# Patient Record
Sex: FEMALE | Race: BLACK OR AFRICAN AMERICAN | NOT HISPANIC OR LATINO | URBAN - METROPOLITAN AREA
[De-identification: names, ages, dates, MRNs, and addresses within clinical notes are randomized per-mention and may not be internally consistent; named-entity substitution may affect disease eponyms.]

---

## 2017-07-03 ENCOUNTER — EMERGENCY (EMERGENCY)
Facility: HOSPITAL | Age: 22
LOS: 0 days | Discharge: HOME | End: 2017-07-03

## 2017-07-03 DIAGNOSIS — R63.4 ABNORMAL WEIGHT LOSS: ICD-10-CM

## 2017-07-03 DIAGNOSIS — R10.13 EPIGASTRIC PAIN: ICD-10-CM

## 2017-07-03 DIAGNOSIS — R11.0 NAUSEA: ICD-10-CM

## 2017-07-06 ENCOUNTER — EMERGENCY (EMERGENCY)
Facility: HOSPITAL | Age: 22
LOS: 0 days | Discharge: HOME | End: 2017-07-06

## 2017-07-06 DIAGNOSIS — N63 UNSPECIFIED LUMP IN BREAST: ICD-10-CM

## 2017-07-06 DIAGNOSIS — R59.1 GENERALIZED ENLARGED LYMPH NODES: ICD-10-CM

## 2017-07-06 DIAGNOSIS — Z79.899 OTHER LONG TERM (CURRENT) DRUG THERAPY: ICD-10-CM

## 2017-07-06 DIAGNOSIS — N64.4 MASTODYNIA: ICD-10-CM

## 2017-11-03 ENCOUNTER — EMERGENCY (EMERGENCY)
Facility: HOSPITAL | Age: 22
LOS: 0 days | Discharge: HOME | End: 2017-11-03

## 2017-11-09 DIAGNOSIS — N64.4 MASTODYNIA: ICD-10-CM

## 2017-11-09 DIAGNOSIS — Z79.899 OTHER LONG TERM (CURRENT) DRUG THERAPY: ICD-10-CM

## 2017-11-09 DIAGNOSIS — R06.02 SHORTNESS OF BREATH: ICD-10-CM

## 2018-02-26 ENCOUNTER — APPOINTMENT (OUTPATIENT)
Dept: BREAST CENTER | Facility: CLINIC | Age: 23
End: 2018-02-26
Payer: COMMERCIAL

## 2018-02-26 ENCOUNTER — EMERGENCY (EMERGENCY)
Facility: HOSPITAL | Age: 23
LOS: 0 days | Discharge: HOME | End: 2018-02-26

## 2018-02-26 VITALS
OXYGEN SATURATION: 97 % | RESPIRATION RATE: 18 BRPM | SYSTOLIC BLOOD PRESSURE: 132 MMHG | HEART RATE: 91 BPM | DIASTOLIC BLOOD PRESSURE: 63 MMHG | TEMPERATURE: 98 F

## 2018-02-26 VITALS
WEIGHT: 118 LBS | DIASTOLIC BLOOD PRESSURE: 72 MMHG | HEIGHT: 66 IN | SYSTOLIC BLOOD PRESSURE: 100 MMHG | BODY MASS INDEX: 18.96 KG/M2

## 2018-02-26 DIAGNOSIS — Z3A.01 LESS THAN 8 WEEKS GESTATION OF PREGNANCY: ICD-10-CM

## 2018-02-26 DIAGNOSIS — Z80.3 FAMILY HISTORY OF MALIGNANT NEOPLASM OF BREAST: ICD-10-CM

## 2018-02-26 DIAGNOSIS — Z79.2 LONG TERM (CURRENT) USE OF ANTIBIOTICS: ICD-10-CM

## 2018-02-26 DIAGNOSIS — R53.1 WEAKNESS: ICD-10-CM

## 2018-02-26 DIAGNOSIS — N64.4 MASTODYNIA: ICD-10-CM

## 2018-02-26 DIAGNOSIS — O91.111: ICD-10-CM

## 2018-02-26 DIAGNOSIS — K21.9 GASTRO-ESOPHAGEAL REFLUX DISEASE W/OUT ESOPHAGITIS: ICD-10-CM

## 2018-02-26 PROBLEM — Z00.00 ENCOUNTER FOR PREVENTIVE HEALTH EXAMINATION: Status: ACTIVE | Noted: 2018-02-26

## 2018-02-26 LAB
ALBUMIN SERPL ELPH-MCNC: 4.4 G/DL — SIGNIFICANT CHANGE UP (ref 3–5.5)
ALP SERPL-CCNC: 67 U/L — SIGNIFICANT CHANGE UP (ref 30–115)
ALT FLD-CCNC: 17 U/L — SIGNIFICANT CHANGE UP (ref 0–41)
ANION GAP SERPL CALC-SCNC: 7 MMOL/L — SIGNIFICANT CHANGE UP (ref 7–14)
APPEARANCE UR: CLEAR — SIGNIFICANT CHANGE UP
APTT BLD: 27.7 SEC — SIGNIFICANT CHANGE UP (ref 27–39.2)
AST SERPL-CCNC: 21 U/L — SIGNIFICANT CHANGE UP (ref 0–41)
BASOPHILS # BLD AUTO: 0.03 K/UL — SIGNIFICANT CHANGE UP (ref 0–0.2)
BASOPHILS NFR BLD AUTO: 0.2 % — SIGNIFICANT CHANGE UP (ref 0–1)
BILIRUB SERPL-MCNC: 0.6 MG/DL — SIGNIFICANT CHANGE UP (ref 0.2–1.2)
BILIRUB UR-MCNC: NEGATIVE — SIGNIFICANT CHANGE UP
BUN SERPL-MCNC: 13 MG/DL — SIGNIFICANT CHANGE UP (ref 10–20)
CALCIUM SERPL-MCNC: 9.5 MG/DL — SIGNIFICANT CHANGE UP (ref 8.5–10.1)
CHLORIDE SERPL-SCNC: 104 MMOL/L — SIGNIFICANT CHANGE UP (ref 98–110)
CO2 SERPL-SCNC: 22 MMOL/L — SIGNIFICANT CHANGE UP (ref 17–32)
COLOR SPEC: YELLOW — SIGNIFICANT CHANGE UP
CREAT SERPL-MCNC: 0.8 MG/DL — SIGNIFICANT CHANGE UP (ref 0.7–1.5)
DIFF PNL FLD: NEGATIVE — SIGNIFICANT CHANGE UP
EOSINOPHIL # BLD AUTO: 0.03 K/UL — SIGNIFICANT CHANGE UP (ref 0–0.7)
EOSINOPHIL NFR BLD AUTO: 0.2 % — SIGNIFICANT CHANGE UP (ref 0–8)
GLUCOSE SERPL-MCNC: 95 MG/DL — SIGNIFICANT CHANGE UP (ref 70–110)
GLUCOSE UR QL: NEGATIVE MG/DL — SIGNIFICANT CHANGE UP
HCT VFR BLD CALC: 37.2 % — SIGNIFICANT CHANGE UP (ref 37–47)
HGB BLD-MCNC: 12.9 G/DL — LOW (ref 14–18)
IMM GRANULOCYTES NFR BLD AUTO: 0.4 % — HIGH (ref 0.1–0.3)
INR BLD: 1.14 RATIO — SIGNIFICANT CHANGE UP (ref 0.65–1.3)
KETONES UR-MCNC: 40
LACTATE SERPL-SCNC: 1.8 MMOL/L — SIGNIFICANT CHANGE UP (ref 0.5–2.2)
LEUKOCYTE ESTERASE UR-ACNC: NEGATIVE — SIGNIFICANT CHANGE UP
LYMPHOCYTES # BLD AUTO: 1.78 K/UL — SIGNIFICANT CHANGE UP (ref 1.2–3.4)
LYMPHOCYTES # BLD AUTO: 13.3 % — LOW (ref 20.5–51.1)
MCHC RBC-ENTMCNC: 30.7 PG — SIGNIFICANT CHANGE UP (ref 27–31)
MCHC RBC-ENTMCNC: 34.7 G/DL — SIGNIFICANT CHANGE UP (ref 32–37)
MCV RBC AUTO: 88.6 FL — SIGNIFICANT CHANGE UP (ref 81–91)
MONOCYTES # BLD AUTO: 0.89 K/UL — HIGH (ref 0.1–0.6)
MONOCYTES NFR BLD AUTO: 6.7 % — SIGNIFICANT CHANGE UP (ref 1.7–9.3)
NEUTROPHILS # BLD AUTO: 10.58 K/UL — HIGH (ref 1.4–6.5)
NEUTROPHILS NFR BLD AUTO: 79.2 % — HIGH (ref 42.2–75.2)
NITRITE UR-MCNC: NEGATIVE — SIGNIFICANT CHANGE UP
NRBC # BLD: 0 /100 WBCS — SIGNIFICANT CHANGE UP (ref 0–0)
PH UR: 6.5 — SIGNIFICANT CHANGE UP (ref 5–8)
PLATELET # BLD AUTO: 332 K/UL — SIGNIFICANT CHANGE UP (ref 130–400)
POTASSIUM SERPL-MCNC: 3.7 MMOL/L — SIGNIFICANT CHANGE UP (ref 3.5–5)
POTASSIUM SERPL-SCNC: 3.7 MMOL/L — SIGNIFICANT CHANGE UP (ref 3.5–5)
PROT SERPL-MCNC: 7.6 G/DL — SIGNIFICANT CHANGE UP (ref 6–8)
PROT UR-MCNC: NEGATIVE MG/DL — SIGNIFICANT CHANGE UP
PROTHROM AB SERPL-ACNC: 12.4 SEC — SIGNIFICANT CHANGE UP (ref 9.95–12.87)
RBC # BLD: 4.2 M/UL — SIGNIFICANT CHANGE UP (ref 4.2–5.4)
RBC # FLD: 11.9 % — SIGNIFICANT CHANGE UP (ref 11.5–14.5)
SODIUM SERPL-SCNC: 133 MMOL/L — LOW (ref 135–146)
SP GR SPEC: 1.02 — SIGNIFICANT CHANGE UP (ref 1.01–1.03)
UROBILINOGEN FLD QL: 1 MG/DL (ref 0.2–0.2)
WBC # BLD: 13.36 K/UL — HIGH (ref 4.8–10.8)
WBC # FLD AUTO: 13.36 K/UL — HIGH (ref 4.8–10.8)

## 2018-02-26 PROCEDURE — 99203 OFFICE O/P NEW LOW 30 MIN: CPT

## 2018-02-26 PROCEDURE — 10022: CPT

## 2018-02-26 RX ORDER — SODIUM CHLORIDE 9 MG/ML
3 INJECTION INTRAMUSCULAR; INTRAVENOUS; SUBCUTANEOUS EVERY 8 HOURS
Qty: 0 | Refills: 0 | Status: DISCONTINUED | OUTPATIENT
Start: 2018-02-26 | End: 2018-02-26

## 2018-02-26 RX ORDER — OXYCODONE AND ACETAMINOPHEN 5; 325 MG/1; MG/1
1 TABLET ORAL ONCE
Qty: 0 | Refills: 0 | Status: DISCONTINUED | OUTPATIENT
Start: 2018-02-26 | End: 2018-02-26

## 2018-02-26 RX ORDER — AMPICILLIN SODIUM AND SULBACTAM SODIUM 250; 125 MG/ML; MG/ML
3 INJECTION, POWDER, FOR SUSPENSION INTRAMUSCULAR; INTRAVENOUS ONCE
Qty: 0 | Refills: 0 | Status: COMPLETED | OUTPATIENT
Start: 2018-02-26 | End: 2018-02-26

## 2018-02-26 RX ORDER — KETOROLAC TROMETHAMINE 30 MG/ML
30 SYRINGE (ML) INJECTION ONCE
Qty: 0 | Refills: 0 | Status: DISCONTINUED | OUTPATIENT
Start: 2018-02-26 | End: 2018-02-26

## 2018-02-26 RX ORDER — SODIUM CHLORIDE 9 MG/ML
1000 INJECTION INTRAMUSCULAR; INTRAVENOUS; SUBCUTANEOUS
Qty: 0 | Refills: 0 | Status: DISCONTINUED | OUTPATIENT
Start: 2018-02-26 | End: 2018-02-26

## 2018-02-26 RX ADMIN — OXYCODONE AND ACETAMINOPHEN 1 TABLET(S): 5; 325 TABLET ORAL at 04:08

## 2018-02-26 RX ADMIN — SODIUM CHLORIDE 1000 MILLILITER(S): 9 INJECTION INTRAMUSCULAR; INTRAVENOUS; SUBCUTANEOUS at 03:05

## 2018-02-26 RX ADMIN — OXYCODONE AND ACETAMINOPHEN 1 TABLET(S): 5; 325 TABLET ORAL at 03:06

## 2018-02-26 RX ADMIN — Medication 30 MILLIGRAM(S): at 04:08

## 2018-02-26 RX ADMIN — AMPICILLIN SODIUM AND SULBACTAM SODIUM 200 GRAM(S): 250; 125 INJECTION, POWDER, FOR SUSPENSION INTRAMUSCULAR; INTRAVENOUS at 04:08

## 2018-02-26 RX ADMIN — Medication 30 MILLIGRAM(S): at 03:05

## 2018-02-26 NOTE — ED PROVIDER NOTE - OBJECTIVE STATEMENT
23 yo female no sig hx present c/o left breast pain/swelling and redness worsening over the past 4 days. movement and palpation the pain. LMP - January. admits chills and weakness but denies fever/abd pain/n/v/d/urinary sxs. denies injury to breast and any bite her breast. denies pregnancy.

## 2018-02-26 NOTE — ED PROVIDER NOTE - PROGRESS NOTE DETAILS
bedside sono performed and there a fluid collection at 12 O'clock of left nipple. bedside sono performed and there a fluid collection at 12 O'clock of left nipple. spoke with surgical team over the phone, patient can be seen at [facility] in the morning for breast sono and aspiration. bedside sono performed. + intrauterine pregnancy with gestational sac, yolk sac/fetal pole and fetal heart activity  HR- 130. advised patient not to take bactrim anymore and continue keflex.

## 2018-02-26 NOTE — ED PROVIDER NOTE - SKIN, MLM
+ 3 cm erythema/tenderness/induration/softness at 12 O'clock of left nipple c/w abscess. right breast nml without mass and tenderness and  skin changes.

## 2018-02-26 NOTE — ED PROVIDER NOTE - CARE PLAN
Principal Discharge DX:	Breast abscess  Secondary Diagnosis:	Less than 8 weeks gestation of pregnancy

## 2018-02-27 LAB
CULTURE RESULTS: NO GROWTH — SIGNIFICANT CHANGE UP
SPECIMEN SOURCE: SIGNIFICANT CHANGE UP

## 2018-03-02 ENCOUNTER — OUTPATIENT (OUTPATIENT)
Dept: OUTPATIENT SERVICES | Facility: HOSPITAL | Age: 23
LOS: 1 days | Discharge: HOME | End: 2018-03-02

## 2018-03-02 DIAGNOSIS — R92.8 OTHER ABNORMAL AND INCONCLUSIVE FINDINGS ON DIAGNOSTIC IMAGING OF BREAST: ICD-10-CM

## 2018-03-03 LAB
CULTURE RESULTS: SIGNIFICANT CHANGE UP
CULTURE RESULTS: SIGNIFICANT CHANGE UP
SPECIMEN SOURCE: SIGNIFICANT CHANGE UP
SPECIMEN SOURCE: SIGNIFICANT CHANGE UP

## 2018-03-05 ENCOUNTER — APPOINTMENT (OUTPATIENT)
Dept: BREAST CENTER | Facility: CLINIC | Age: 23
End: 2018-03-05

## 2018-03-05 LAB — BACTERIA WND CULT: ABNORMAL

## 2018-03-19 ENCOUNTER — EMERGENCY (EMERGENCY)
Facility: HOSPITAL | Age: 23
LOS: 0 days | Discharge: HOME | End: 2018-03-19
Attending: EMERGENCY MEDICINE

## 2018-03-19 VITALS
OXYGEN SATURATION: 100 % | RESPIRATION RATE: 18 BRPM | SYSTOLIC BLOOD PRESSURE: 100 MMHG | DIASTOLIC BLOOD PRESSURE: 59 MMHG | TEMPERATURE: 97 F | HEART RATE: 84 BPM

## 2018-03-19 VITALS
OXYGEN SATURATION: 99 % | RESPIRATION RATE: 18 BRPM | DIASTOLIC BLOOD PRESSURE: 79 MMHG | SYSTOLIC BLOOD PRESSURE: 129 MMHG | TEMPERATURE: 98 F | HEART RATE: 83 BPM

## 2018-03-19 DIAGNOSIS — Z3A.10 10 WEEKS GESTATION OF PREGNANCY: ICD-10-CM

## 2018-03-19 DIAGNOSIS — Z79.899 OTHER LONG TERM (CURRENT) DRUG THERAPY: ICD-10-CM

## 2018-03-19 DIAGNOSIS — O26.891 OTHER SPECIFIED PREGNANCY RELATED CONDITIONS, FIRST TRIMESTER: ICD-10-CM

## 2018-03-19 DIAGNOSIS — R11.10 VOMITING, UNSPECIFIED: ICD-10-CM

## 2018-03-19 DIAGNOSIS — R10.2 PELVIC AND PERINEAL PAIN: ICD-10-CM

## 2018-03-19 LAB
ALBUMIN SERPL ELPH-MCNC: 4.4 G/DL — SIGNIFICANT CHANGE UP (ref 3.5–5.2)
ALP SERPL-CCNC: 59 U/L — SIGNIFICANT CHANGE UP (ref 30–115)
ALT FLD-CCNC: 18 U/L — SIGNIFICANT CHANGE UP (ref 0–41)
ANION GAP SERPL CALC-SCNC: 13 MMOL/L — SIGNIFICANT CHANGE UP (ref 7–14)
APPEARANCE UR: (no result)
AST SERPL-CCNC: 15 U/L — SIGNIFICANT CHANGE UP (ref 0–41)
BASOPHILS # BLD AUTO: 0.03 K/UL — SIGNIFICANT CHANGE UP (ref 0–0.2)
BASOPHILS NFR BLD AUTO: 0.3 % — SIGNIFICANT CHANGE UP (ref 0–1)
BILIRUB SERPL-MCNC: 0.3 MG/DL — SIGNIFICANT CHANGE UP (ref 0.2–1.2)
BILIRUB UR-MCNC: NEGATIVE — SIGNIFICANT CHANGE UP
BUN SERPL-MCNC: 12 MG/DL — SIGNIFICANT CHANGE UP (ref 10–20)
CALCIUM SERPL-MCNC: 9 MG/DL — SIGNIFICANT CHANGE UP (ref 8.5–10.1)
CHLORIDE SERPL-SCNC: 98 MMOL/L — SIGNIFICANT CHANGE UP (ref 98–110)
CO2 SERPL-SCNC: 22 MMOL/L — SIGNIFICANT CHANGE UP (ref 17–32)
COLOR SPEC: YELLOW — SIGNIFICANT CHANGE UP
CREAT SERPL-MCNC: 0.6 MG/DL — LOW (ref 0.7–1.5)
DIFF PNL FLD: NEGATIVE — SIGNIFICANT CHANGE UP
EOSINOPHIL # BLD AUTO: 0.03 K/UL — SIGNIFICANT CHANGE UP (ref 0–0.7)
EOSINOPHIL NFR BLD AUTO: 0.3 % — SIGNIFICANT CHANGE UP (ref 0–8)
GLUCOSE SERPL-MCNC: 79 MG/DL — SIGNIFICANT CHANGE UP (ref 70–110)
GLUCOSE UR QL: NEGATIVE MG/DL — SIGNIFICANT CHANGE UP
HCG SERPL-ACNC: HIGH MIU/ML (ref 0–5)
HCT VFR BLD CALC: 36.1 % — LOW (ref 37–47)
HGB BLD-MCNC: 12.1 G/DL — SIGNIFICANT CHANGE UP (ref 12–16)
IMM GRANULOCYTES NFR BLD AUTO: 0.3 % — SIGNIFICANT CHANGE UP (ref 0.1–0.3)
KETONES UR-MCNC: NEGATIVE — SIGNIFICANT CHANGE UP
LEUKOCYTE ESTERASE UR-ACNC: NEGATIVE — SIGNIFICANT CHANGE UP
LYMPHOCYTES # BLD AUTO: 19.9 % — LOW (ref 20.5–51.1)
LYMPHOCYTES # BLD AUTO: 2.32 K/UL — SIGNIFICANT CHANGE UP (ref 1.2–3.4)
MCHC RBC-ENTMCNC: 30.6 PG — SIGNIFICANT CHANGE UP (ref 27–31)
MCHC RBC-ENTMCNC: 33.5 G/DL — SIGNIFICANT CHANGE UP (ref 32–37)
MCV RBC AUTO: 91.2 FL — SIGNIFICANT CHANGE UP (ref 81–99)
MONOCYTES # BLD AUTO: 0.89 K/UL — HIGH (ref 0.1–0.6)
MONOCYTES NFR BLD AUTO: 7.6 % — SIGNIFICANT CHANGE UP (ref 1.7–9.3)
NEUTROPHILS # BLD AUTO: 8.36 K/UL — HIGH (ref 1.4–6.5)
NEUTROPHILS NFR BLD AUTO: 71.6 % — SIGNIFICANT CHANGE UP (ref 42.2–75.2)
NITRITE UR-MCNC: NEGATIVE — SIGNIFICANT CHANGE UP
PH UR: 7.5 — SIGNIFICANT CHANGE UP (ref 5–8)
PLATELET # BLD AUTO: 358 K/UL — SIGNIFICANT CHANGE UP (ref 130–400)
POTASSIUM SERPL-MCNC: 3.7 MMOL/L — SIGNIFICANT CHANGE UP (ref 3.5–5)
POTASSIUM SERPL-SCNC: 3.7 MMOL/L — SIGNIFICANT CHANGE UP (ref 3.5–5)
PROT SERPL-MCNC: 7.2 G/DL — SIGNIFICANT CHANGE UP (ref 6–8)
PROT UR-MCNC: (no result) MG/DL
RBC # BLD: 3.96 M/UL — LOW (ref 4.2–5.4)
RBC # FLD: 12.6 % — SIGNIFICANT CHANGE UP (ref 11.5–14.5)
SODIUM SERPL-SCNC: 133 MMOL/L — LOW (ref 135–146)
SP GR SPEC: 1.02 — SIGNIFICANT CHANGE UP (ref 1.01–1.03)
UROBILINOGEN FLD QL: 0.2 MG/DL — SIGNIFICANT CHANGE UP (ref 0.2–0.2)
WBC # BLD: 11.67 K/UL — HIGH (ref 4.8–10.8)
WBC # FLD AUTO: 11.67 K/UL — HIGH (ref 4.8–10.8)

## 2018-03-19 RX ORDER — ONDANSETRON 8 MG/1
4 TABLET, FILM COATED ORAL ONCE
Qty: 0 | Refills: 0 | Status: COMPLETED | OUTPATIENT
Start: 2018-03-19 | End: 2018-03-19

## 2018-03-19 RX ORDER — SODIUM CHLORIDE 9 MG/ML
2000 INJECTION INTRAMUSCULAR; INTRAVENOUS; SUBCUTANEOUS ONCE
Qty: 0 | Refills: 0 | Status: COMPLETED | OUTPATIENT
Start: 2018-03-19 | End: 2018-03-19

## 2018-03-19 RX ADMIN — SODIUM CHLORIDE 1000 MILLILITER(S): 9 INJECTION INTRAMUSCULAR; INTRAVENOUS; SUBCUTANEOUS at 18:53

## 2018-03-19 RX ADMIN — ONDANSETRON 4 MILLIGRAM(S): 8 TABLET, FILM COATED ORAL at 18:53

## 2018-03-19 NOTE — ED PROVIDER NOTE - MEDICAL DECISION MAKING DETAILS
Presented for pelvic cramping and mild vomiting. No dehydration. U/S with +IUP, +FH, labs and U/A with no acute abnormalities. Prescribed prenatal vitamins and referred to OB for further prenatal management.

## 2018-03-19 NOTE — ED PROVIDER NOTE - NS ED ROS FT
Gen: No fevers, chills  Eyes:  No visual changes, eye pain or discharge.  ENMT:  No hearing changes, pain. No neck pain or stiffness.  Cardiac:  No chest pain, SOB   Respiratory:  No cough or respiratory distress.  GI:  see HPI  :  No dysuria, frequency or burning.  See HPI  MS:  No myalgia, muscle weakness, joint pain or back pain.  Neuro:  No headache or weakness.  No LOC.  Skin:  No skin rash.   Endocrine: No history of thyroid disease or diabetes.

## 2018-03-19 NOTE — ED PROVIDER NOTE - CARE PLAN
Principal Discharge DX:	Abdominal pain in pregnancy  Assessment and plan of treatment:	Pt tolerating PO, in no acute distress, will follow up with her ob/gyn.

## 2018-03-19 NOTE — ED PROVIDER NOTE - PHYSICAL EXAMINATION
CONSTITUTIONAL: Well-developed; thin, in no acute distress.   SKIN: warm, dry  HEAD: Normocephalic; atraumatic.  EYES: no conj injection  ENT: No nasal discharge; airway clear.  NECK: Supple; non tender.  CARD: S1, S2 normal; no murmurs, gallops, or rubs. Regular rate and rhythm.   RESP: No wheezes, rales or rhonchi.  ABD: soft ntnd  : Chaperoned by PCA:  Normal external anatomy, mild milky white discharge, no CMT, cervix closed, no adnexal tenderness.    EXT: Normal ROM.  No clubbing, cyanosis or edema.   NEURO: Alert, oriented, grossly unremarkable  PSYCH: Cooperative, appropriate.

## 2018-03-20 LAB
C TRACH RRNA SPEC QL NAA+PROBE: SIGNIFICANT CHANGE UP
CULTURE RESULTS: SIGNIFICANT CHANGE UP
N GONORRHOEA RRNA SPEC QL NAA+PROBE: SIGNIFICANT CHANGE UP
SPECIMEN SOURCE: SIGNIFICANT CHANGE UP
SPECIMEN SOURCE: SIGNIFICANT CHANGE UP

## 2018-05-07 ENCOUNTER — FORM ENCOUNTER (OUTPATIENT)
Age: 23
End: 2018-05-07

## 2018-05-07 ENCOUNTER — EMERGENCY (EMERGENCY)
Facility: HOSPITAL | Age: 23
LOS: 0 days | Discharge: HOME | End: 2018-05-07
Attending: EMERGENCY MEDICINE | Admitting: EMERGENCY MEDICINE

## 2018-05-07 VITALS
DIASTOLIC BLOOD PRESSURE: 70 MMHG | SYSTOLIC BLOOD PRESSURE: 125 MMHG | HEART RATE: 90 BPM | TEMPERATURE: 98 F | RESPIRATION RATE: 20 BRPM | OXYGEN SATURATION: 96 %

## 2018-05-07 DIAGNOSIS — Z79.899 OTHER LONG TERM (CURRENT) DRUG THERAPY: ICD-10-CM

## 2018-05-07 DIAGNOSIS — N61.1 ABSCESS OF THE BREAST AND NIPPLE: ICD-10-CM

## 2018-05-07 DIAGNOSIS — N64.4 MASTODYNIA: ICD-10-CM

## 2018-05-07 RX ORDER — FAMOTIDINE 10 MG/ML
40 INJECTION INTRAVENOUS ONCE
Qty: 0 | Refills: 0 | Status: COMPLETED | OUTPATIENT
Start: 2018-05-07 | End: 2018-05-07

## 2018-05-07 RX ADMIN — Medication 100 MILLIGRAM(S): at 11:20

## 2018-05-07 RX ADMIN — FAMOTIDINE 40 MILLIGRAM(S): 10 INJECTION INTRAVENOUS at 13:32

## 2018-05-07 NOTE — ED PROVIDER NOTE - PROGRESS NOTE DETAILS
ATTENDING NOTE:  33-year-old female denies significant medical history complains of left breast abscess for 2 days. Symptoms are constant, denies modifying factors, denies fever or other associated complaints.  Patient reports 1 similar episode recently. The pt was examined with a chaperone (name: [nurse lamar] ), the breasts are symmetrical in appearance, left breast with moderate erythema of the upper breast involving the areole, induration proximally 5x5 centimeters, with warmth, no evidence of masses, dimpling or flattening, no thickening or edema, nipples show no inversion, no ulcerations or nipple discharge, no axillary or clavicular lymphadenopathy appreciated.  Impression is breast abscess, recommend oral antibiotics and outpatient follow-up within 24 hours.The patient was advised to return to the emergency department in 2-3 days if not improving or sooner if any new symptoms developed, symptoms worsened or for any concerns. The patient was offered the opportunity to ask questions and verbalized that they understand the diagnosis and discharge instructions. multiple attempts made to contact imaging center without success.  Spoke to JOHNATHON Velasco,  awaiting call back will d/c with abx.  pt will make appt at breast clinic

## 2018-05-07 NOTE — ED PROVIDER NOTE - OBJECTIVE STATEMENT
22 yo F w 2 days of left breast pain and redness.  no fever.  pt states its happened to her once before and she needed her breast abscess drained.  Not currently breast feeding.  no other abscesses on the body

## 2018-05-07 NOTE — ED PROVIDER NOTE - NS ED ROS FT
Eyes:  No visual changes, eye pain or discharge.  ENMT:  No hearing changes, pain, no sore throat or runny nose, no difficulty swallowing  Cardiac:  No chest pain, SOB or edema. No chest pain with exertion.  Respiratory:  No cough or respiratory distress. No hemoptysis. No history of asthma or RAD.  GI:  No nausea, vomiting, diarrhea or abdominal pain.  :  No dysuria, frequency or burning.  MS:  No myalgia, muscle weakness, joint pain or back pain.  Neuro:  No headache or weakness.  No LOC.  Skin:  left breast abscess   Endocrine: No history of thyroid disease or diabetes.

## 2018-05-07 NOTE — ED PROVIDER NOTE - PHYSICAL EXAMINATION
CONSTITUTIONAL: Well-developed; well-nourished; in no acute distress.   SKIN: 5cm by 5cm area of induration on left breast on Left lateral areola, no fluctuance,  tender to palpation   HEAD: Normocephalic; atraumatic.  EYES: PERRL, EOMI, no conjunctival erythema  ENT: No nasal discharge; airway clear.  NECK: Supple; non tender.  CARD: S1, S2 normal; no murmurs, gallops, or rubs. Regular rate and rhythm.   RESP: No wheezes, rales or rhonchi.  ABD: soft ntnd  EXT: Normal ROM.  No clubbing, cyanosis or edema.   LYMPH: No acute cervical adenopathy.  NEURO: Alert, oriented, grossly unremarkable  PSYCH: Cooperative, appropriate.

## 2018-05-08 ENCOUNTER — RECORD ABSTRACTING (OUTPATIENT)
Age: 23
End: 2018-05-08

## 2018-05-08 ENCOUNTER — OUTPATIENT (OUTPATIENT)
Dept: OUTPATIENT SERVICES | Facility: HOSPITAL | Age: 23
LOS: 1 days | Discharge: HOME | End: 2018-05-08

## 2018-05-08 DIAGNOSIS — Z98.890 OTHER SPECIFIED POSTPROCEDURAL STATES: Chronic | ICD-10-CM

## 2018-05-08 DIAGNOSIS — N64.59 OTHER SIGNS AND SYMPTOMS IN BREAST: ICD-10-CM

## 2018-05-11 ENCOUNTER — APPOINTMENT (OUTPATIENT)
Dept: BREAST CENTER | Facility: CLINIC | Age: 23
End: 2018-05-11
Payer: COMMERCIAL

## 2018-05-11 VITALS
HEART RATE: 84 BPM | WEIGHT: 108 LBS | HEIGHT: 66 IN | DIASTOLIC BLOOD PRESSURE: 70 MMHG | OXYGEN SATURATION: 98 % | SYSTOLIC BLOOD PRESSURE: 118 MMHG | BODY MASS INDEX: 17.36 KG/M2

## 2018-05-11 PROCEDURE — 99213 OFFICE O/P EST LOW 20 MIN: CPT

## 2018-05-12 LAB
CULTURE RESULTS: SIGNIFICANT CHANGE UP
SPECIMEN SOURCE: SIGNIFICANT CHANGE UP

## 2018-06-28 ENCOUNTER — INPATIENT (INPATIENT)
Facility: HOSPITAL | Age: 23
LOS: 1 days | Discharge: HOME | End: 2018-06-30
Attending: SURGERY | Admitting: SURGERY

## 2018-06-28 VITALS
OXYGEN SATURATION: 100 % | SYSTOLIC BLOOD PRESSURE: 104 MMHG | DIASTOLIC BLOOD PRESSURE: 60 MMHG | RESPIRATION RATE: 20 BRPM | HEART RATE: 101 BPM | TEMPERATURE: 98 F

## 2018-06-28 RX ORDER — KETOROLAC TROMETHAMINE 30 MG/ML
30 SYRINGE (ML) INJECTION ONCE
Qty: 0 | Refills: 0 | Status: DISCONTINUED | OUTPATIENT
Start: 2018-06-28 | End: 2018-06-28

## 2018-06-28 RX ADMIN — Medication 30 MILLIGRAM(S): at 23:58

## 2018-06-28 NOTE — ED PROVIDER NOTE - PHYSICAL EXAMINATION
VITAL SIGNS: I have reviewed nursing notes and confirm.  CONSTITUTIONAL: Well-developed; well-nourished; in mild distress.  SKIN: Skin exam is warm and dry.   HEAD: Normocephalic; atraumatic.  EYES: PERRL, EOM intact; conjunctiva and sclera clear.  ENT: No nasal discharge; airway clear.   NECK: Supple; non tender.  BREAST: r breast appears nml. left breast is erytehmatous, swollen. pt refusing palpation.   CARD:+ S1, S2   RESP: No wheezes, rales or rhonchi.  EXT: Normal ROM. No cyanosis or edema.  LYMPH: No acute adenopathy.  NEURO: Alert. Grossly unremarkable. No focal deficits.      exam chap. by POLLO Rushing.

## 2018-06-28 NOTE — ED PROVIDER NOTE - OBJECTIVE STATEMENT
pt co left breast pain for several days. hx of breast abscess there, had nedle aspiration done in the past. no fever or chills. was sent in by her breast doctor , Dr Mccormack for eval.

## 2018-06-28 NOTE — ED ADULT TRIAGE NOTE - CHIEF COMPLAINT QUOTE
"I have pain on my left breast. It's an abscess that's getting larger and I want to scream." Family states pt on antibiotics.

## 2018-06-29 DIAGNOSIS — Z98.890 OTHER SPECIFIED POSTPROCEDURAL STATES: Chronic | ICD-10-CM

## 2018-06-29 LAB
ABO RH CONFIRMATION: SIGNIFICANT CHANGE UP
ALBUMIN SERPL ELPH-MCNC: 4.9 G/DL — SIGNIFICANT CHANGE UP (ref 3.5–5.2)
ALP SERPL-CCNC: 89 U/L — SIGNIFICANT CHANGE UP (ref 30–115)
ALT FLD-CCNC: 20 U/L — SIGNIFICANT CHANGE UP (ref 0–41)
ANION GAP SERPL CALC-SCNC: 25 MMOL/L — HIGH (ref 7–14)
AST SERPL-CCNC: 31 U/L — SIGNIFICANT CHANGE UP (ref 0–41)
BASOPHILS # BLD AUTO: 0.04 K/UL — SIGNIFICANT CHANGE UP (ref 0–0.2)
BASOPHILS # BLD AUTO: 0.04 K/UL — SIGNIFICANT CHANGE UP (ref 0–0.2)
BASOPHILS NFR BLD AUTO: 0.2 % — SIGNIFICANT CHANGE UP (ref 0–1)
BASOPHILS NFR BLD AUTO: 0.3 % — SIGNIFICANT CHANGE UP (ref 0–1)
BILIRUB SERPL-MCNC: 0.4 MG/DL — SIGNIFICANT CHANGE UP (ref 0.2–1.2)
BLD GP AB SCN SERPL QL: SIGNIFICANT CHANGE UP
BUN SERPL-MCNC: 12 MG/DL — SIGNIFICANT CHANGE UP (ref 10–20)
CALCIUM SERPL-MCNC: 9.7 MG/DL — SIGNIFICANT CHANGE UP (ref 8.5–10.1)
CHLORIDE SERPL-SCNC: 97 MMOL/L — LOW (ref 98–110)
CO2 SERPL-SCNC: 17 MMOL/L — SIGNIFICANT CHANGE UP (ref 17–32)
CREAT SERPL-MCNC: 0.8 MG/DL — SIGNIFICANT CHANGE UP (ref 0.7–1.5)
EOSINOPHIL # BLD AUTO: 0.01 K/UL — SIGNIFICANT CHANGE UP (ref 0–0.7)
EOSINOPHIL # BLD AUTO: 0.03 K/UL — SIGNIFICANT CHANGE UP (ref 0–0.7)
EOSINOPHIL NFR BLD AUTO: 0.1 % — SIGNIFICANT CHANGE UP (ref 0–8)
EOSINOPHIL NFR BLD AUTO: 0.2 % — SIGNIFICANT CHANGE UP (ref 0–8)
GLUCOSE SERPL-MCNC: 59 MG/DL — LOW (ref 70–99)
HCT VFR BLD CALC: 33.9 % — LOW (ref 37–47)
HCT VFR BLD CALC: 40.2 % — SIGNIFICANT CHANGE UP (ref 37–47)
HGB BLD-MCNC: 11.6 G/DL — LOW (ref 12–16)
HGB BLD-MCNC: 13.9 G/DL — SIGNIFICANT CHANGE UP (ref 12–16)
IMM GRANULOCYTES NFR BLD AUTO: 0.3 % — SIGNIFICANT CHANGE UP (ref 0.1–0.3)
IMM GRANULOCYTES NFR BLD AUTO: 0.4 % — HIGH (ref 0.1–0.3)
LYMPHOCYTES # BLD AUTO: 1.26 K/UL — SIGNIFICANT CHANGE UP (ref 1.2–3.4)
LYMPHOCYTES # BLD AUTO: 1.94 K/UL — SIGNIFICANT CHANGE UP (ref 1.2–3.4)
LYMPHOCYTES # BLD AUTO: 11.1 % — LOW (ref 20.5–51.1)
LYMPHOCYTES # BLD AUTO: 8.3 % — LOW (ref 20.5–51.1)
MCHC RBC-ENTMCNC: 30.3 PG — SIGNIFICANT CHANGE UP (ref 27–31)
MCHC RBC-ENTMCNC: 30.4 PG — SIGNIFICANT CHANGE UP (ref 27–31)
MCHC RBC-ENTMCNC: 34.2 G/DL — SIGNIFICANT CHANGE UP (ref 32–37)
MCHC RBC-ENTMCNC: 34.6 G/DL — SIGNIFICANT CHANGE UP (ref 32–37)
MCV RBC AUTO: 88 FL — SIGNIFICANT CHANGE UP (ref 81–99)
MCV RBC AUTO: 88.5 FL — SIGNIFICANT CHANGE UP (ref 81–99)
MONOCYTES # BLD AUTO: 1.06 K/UL — HIGH (ref 0.1–0.6)
MONOCYTES # BLD AUTO: 1.19 K/UL — HIGH (ref 0.1–0.6)
MONOCYTES NFR BLD AUTO: 6.8 % — SIGNIFICANT CHANGE UP (ref 1.7–9.3)
MONOCYTES NFR BLD AUTO: 7 % — SIGNIFICANT CHANGE UP (ref 1.7–9.3)
NEUTROPHILS # BLD AUTO: 12.76 K/UL — HIGH (ref 1.4–6.5)
NEUTROPHILS # BLD AUTO: 14.2 K/UL — HIGH (ref 1.4–6.5)
NEUTROPHILS NFR BLD AUTO: 81.5 % — HIGH (ref 42.2–75.2)
NEUTROPHILS NFR BLD AUTO: 83.8 % — HIGH (ref 42.2–75.2)
NRBC # BLD: 0 /100 WBCS — SIGNIFICANT CHANGE UP (ref 0–0)
NRBC # BLD: 0 /100 WBCS — SIGNIFICANT CHANGE UP (ref 0–0)
PLATELET # BLD AUTO: 297 K/UL — SIGNIFICANT CHANGE UP (ref 130–400)
PLATELET # BLD AUTO: 349 K/UL — SIGNIFICANT CHANGE UP (ref 130–400)
POTASSIUM SERPL-MCNC: 4.4 MMOL/L — SIGNIFICANT CHANGE UP (ref 3.5–5)
POTASSIUM SERPL-SCNC: 4.4 MMOL/L — SIGNIFICANT CHANGE UP (ref 3.5–5)
PROT SERPL-MCNC: 8.3 G/DL — HIGH (ref 6–8)
RBC # BLD: 3.83 M/UL — LOW (ref 4.2–5.4)
RBC # BLD: 4.57 M/UL — SIGNIFICANT CHANGE UP (ref 4.2–5.4)
RBC # FLD: 11.9 % — SIGNIFICANT CHANGE UP (ref 11.5–14.5)
RBC # FLD: 11.9 % — SIGNIFICANT CHANGE UP (ref 11.5–14.5)
SODIUM SERPL-SCNC: 139 MMOL/L — SIGNIFICANT CHANGE UP (ref 135–146)
TYPE + AB SCN PNL BLD: SIGNIFICANT CHANGE UP
WBC # BLD: 15.21 K/UL — HIGH (ref 4.8–10.8)
WBC # BLD: 17.43 K/UL — HIGH (ref 4.8–10.8)
WBC # FLD AUTO: 15.21 K/UL — HIGH (ref 4.8–10.8)
WBC # FLD AUTO: 17.43 K/UL — HIGH (ref 4.8–10.8)

## 2018-06-29 RX ORDER — VANCOMYCIN HCL 1 G
1000 VIAL (EA) INTRAVENOUS EVERY 12 HOURS
Qty: 0 | Refills: 0 | Status: DISCONTINUED | OUTPATIENT
Start: 2018-06-29 | End: 2018-06-30

## 2018-06-29 RX ORDER — PANTOPRAZOLE SODIUM 20 MG/1
40 TABLET, DELAYED RELEASE ORAL
Qty: 0 | Refills: 0 | Status: DISCONTINUED | OUTPATIENT
Start: 2018-06-29 | End: 2018-06-30

## 2018-06-29 RX ORDER — LACTOBACILLUS ACIDOPHILUS 100MM CELL
1 CAPSULE ORAL EVERY 8 HOURS
Qty: 0 | Refills: 0 | Status: DISCONTINUED | OUTPATIENT
Start: 2018-06-29 | End: 2018-06-30

## 2018-06-29 RX ORDER — AMPICILLIN SODIUM AND SULBACTAM SODIUM 250; 125 MG/ML; MG/ML
3 INJECTION, POWDER, FOR SUSPENSION INTRAMUSCULAR; INTRAVENOUS ONCE
Qty: 0 | Refills: 0 | Status: COMPLETED | OUTPATIENT
Start: 2018-06-29 | End: 2018-06-29

## 2018-06-29 RX ORDER — DIPHENHYDRAMINE HCL 50 MG
50 CAPSULE ORAL ONCE
Qty: 0 | Refills: 0 | Status: COMPLETED | OUTPATIENT
Start: 2018-06-29 | End: 2018-06-29

## 2018-06-29 RX ORDER — KETOROLAC TROMETHAMINE 30 MG/ML
30 SYRINGE (ML) INJECTION EVERY 6 HOURS
Qty: 0 | Refills: 0 | Status: DISCONTINUED | OUTPATIENT
Start: 2018-06-29 | End: 2018-06-30

## 2018-06-29 RX ORDER — CEPHALEXIN 500 MG
0 CAPSULE ORAL
Qty: 0 | Refills: 0 | COMMUNITY

## 2018-06-29 RX ORDER — AMPICILLIN SODIUM AND SULBACTAM SODIUM 250; 125 MG/ML; MG/ML
3 INJECTION, POWDER, FOR SUSPENSION INTRAMUSCULAR; INTRAVENOUS EVERY 6 HOURS
Qty: 0 | Refills: 0 | Status: DISCONTINUED | OUTPATIENT
Start: 2018-06-29 | End: 2018-06-30

## 2018-06-29 RX ORDER — VANCOMYCIN HCL 1 G
1000 VIAL (EA) INTRAVENOUS ONCE
Qty: 0 | Refills: 0 | Status: COMPLETED | OUTPATIENT
Start: 2018-06-29 | End: 2018-06-29

## 2018-06-29 RX ORDER — ACETAMINOPHEN 500 MG
650 TABLET ORAL EVERY 6 HOURS
Qty: 0 | Refills: 0 | Status: DISCONTINUED | OUTPATIENT
Start: 2018-06-29 | End: 2018-06-30

## 2018-06-29 RX ORDER — AZTREONAM 2 G
1 VIAL (EA) INJECTION
Qty: 0 | Refills: 0 | COMMUNITY

## 2018-06-29 RX ORDER — SODIUM CHLORIDE 9 MG/ML
1000 INJECTION INTRAMUSCULAR; INTRAVENOUS; SUBCUTANEOUS
Qty: 0 | Refills: 0 | Status: DISCONTINUED | OUTPATIENT
Start: 2018-06-29 | End: 2018-06-29

## 2018-06-29 RX ORDER — VANCOMYCIN HCL 1 G
1000 VIAL (EA) INTRAVENOUS EVERY 12 HOURS
Qty: 0 | Refills: 0 | Status: DISCONTINUED | OUTPATIENT
Start: 2018-06-29 | End: 2018-06-29

## 2018-06-29 RX ORDER — MORPHINE SULFATE 50 MG/1
2 CAPSULE, EXTENDED RELEASE ORAL ONCE
Qty: 0 | Refills: 0 | Status: DISCONTINUED | OUTPATIENT
Start: 2018-06-29 | End: 2018-06-29

## 2018-06-29 RX ORDER — HEPARIN SODIUM 5000 [USP'U]/ML
5000 INJECTION INTRAVENOUS; SUBCUTANEOUS EVERY 8 HOURS
Qty: 0 | Refills: 0 | Status: DISCONTINUED | OUTPATIENT
Start: 2018-06-29 | End: 2018-06-30

## 2018-06-29 RX ADMIN — Medication 250 MILLIGRAM(S): at 17:41

## 2018-06-29 RX ADMIN — MORPHINE SULFATE 2 MILLIGRAM(S): 50 CAPSULE, EXTENDED RELEASE ORAL at 01:15

## 2018-06-29 RX ADMIN — AMPICILLIN SODIUM AND SULBACTAM SODIUM 200 GRAM(S): 250; 125 INJECTION, POWDER, FOR SUSPENSION INTRAMUSCULAR; INTRAVENOUS at 05:12

## 2018-06-29 RX ADMIN — HEPARIN SODIUM 5000 UNIT(S): 5000 INJECTION INTRAVENOUS; SUBCUTANEOUS at 05:13

## 2018-06-29 RX ADMIN — Medication 100 MILLIGRAM(S): at 11:55

## 2018-06-29 RX ADMIN — AMPICILLIN SODIUM AND SULBACTAM SODIUM 200 GRAM(S): 250; 125 INJECTION, POWDER, FOR SUSPENSION INTRAMUSCULAR; INTRAVENOUS at 00:34

## 2018-06-29 RX ADMIN — HEPARIN SODIUM 5000 UNIT(S): 5000 INJECTION INTRAVENOUS; SUBCUTANEOUS at 14:58

## 2018-06-29 RX ADMIN — Medication 30 MILLIGRAM(S): at 04:38

## 2018-06-29 RX ADMIN — Medication 30 MILLIGRAM(S): at 21:19

## 2018-06-29 RX ADMIN — AMPICILLIN SODIUM AND SULBACTAM SODIUM 200 GRAM(S): 250; 125 INJECTION, POWDER, FOR SUSPENSION INTRAMUSCULAR; INTRAVENOUS at 12:33

## 2018-06-29 RX ADMIN — HEPARIN SODIUM 5000 UNIT(S): 5000 INJECTION INTRAVENOUS; SUBCUTANEOUS at 21:04

## 2018-06-29 RX ADMIN — AMPICILLIN SODIUM AND SULBACTAM SODIUM 200 GRAM(S): 250; 125 INJECTION, POWDER, FOR SUSPENSION INTRAMUSCULAR; INTRAVENOUS at 17:03

## 2018-06-29 RX ADMIN — AMPICILLIN SODIUM AND SULBACTAM SODIUM 200 GRAM(S): 250; 125 INJECTION, POWDER, FOR SUSPENSION INTRAMUSCULAR; INTRAVENOUS at 23:17

## 2018-06-29 RX ADMIN — Medication 250 MILLIGRAM(S): at 05:14

## 2018-06-29 RX ADMIN — Medication 1 TABLET(S): at 21:04

## 2018-06-29 RX ADMIN — Medication 1 TABLET(S): at 14:58

## 2018-06-29 RX ADMIN — SODIUM CHLORIDE 125 MILLILITER(S): 9 INJECTION INTRAMUSCULAR; INTRAVENOUS; SUBCUTANEOUS at 04:23

## 2018-06-29 RX ADMIN — SODIUM CHLORIDE 125 MILLILITER(S): 9 INJECTION INTRAMUSCULAR; INTRAVENOUS; SUBCUTANEOUS at 01:13

## 2018-06-29 RX ADMIN — Medication 30 MILLIGRAM(S): at 15:50

## 2018-06-29 RX ADMIN — Medication 30 MILLIGRAM(S): at 16:41

## 2018-06-29 RX ADMIN — Medication 30 MILLIGRAM(S): at 04:23

## 2018-06-29 RX ADMIN — Medication 250 MILLIGRAM(S): at 01:13

## 2018-06-29 RX ADMIN — PANTOPRAZOLE SODIUM 40 MILLIGRAM(S): 20 TABLET, DELAYED RELEASE ORAL at 11:53

## 2018-06-29 RX ADMIN — Medication 50 MILLIGRAM(S): at 06:04

## 2018-06-29 RX ADMIN — Medication 30 MILLIGRAM(S): at 21:04

## 2018-06-29 NOTE — CONSULT NOTE ADULT - ASSESSMENT
IMPRESSION:  Left breast with subcutaneous abscess with faint cellulitis.    RECOMMENDATIONS:  Vancomycin 1 gm iv q12h  Unasyn 3 gm iv q6h  D/C clindamycin

## 2018-06-29 NOTE — H&P ADULT - NSHPPHYSICALEXAM_GEN_ALL_CORE
Vital Signs Last 24 Hrs  T(F): 97.8 (28 Jun 2018 21:43), Max: 97.8 (28 Jun 2018 21:43)  HR: 101 (28 Jun 2018 21:43) (101 - 101)  BP: 104/60 (28 Jun 2018 21:43) (104/60 - 104/60)  RR: 20 (28 Jun 2018 21:43) (20 - 20)  SpO2: 100% (28 Jun 2018 21:43) (100% - 100%)    PHYSICAL EXAM:  GENERAL: A&O, NAD  BREAST: left breast erythema and induration above the nipple, tender to touch, with an area of skin breakdown above the nipple at approximately 1 o'clock position, no pus/bleeding/discahrge  CHEST/LUNG: Bilateral breath sounds  HEART: Regular rate and rhythm  ABDOMEN: Soft, Nondistended, Nontender Vital Signs Last 24 Hrs  T(F): 97.8 (28 Jun 2018 21:43), Max: 97.8 (28 Jun 2018 21:43)  HR: 101 (28 Jun 2018 21:43) (101 - 101)  BP: 104/60 (28 Jun 2018 21:43) (104/60 - 104/60)  RR: 20 (28 Jun 2018 21:43) (20 - 20)  SpO2: 100% (28 Jun 2018 21:43) (100% - 100%)    PHYSICAL EXAM:  GENERAL: A&O, NAD  BREAST: left breast erythema and induration above the nipple, tender to touch, with an area of skin breakdown above the nipple at approximately 1 o'clock position, started to drain spontaneously with purulent drainage   CHEST/LUNG: Bilateral breath sounds  HEART: Regular rate and rhythm  ABDOMEN: Soft, Nondistended, Nontender

## 2018-06-29 NOTE — H&P ADULT - NSHPLABSRESULTS_GEN_ALL_CORE
PENDING LABS:                        13.9   17.43 )-----------( 349      ( 28 Jun 2018 23:59 )             40.2     28 Jun 2018 23:59    139    |  97     |  12     ----------------------------<  59     4.4     |  17     |  0.8      Ca    9.7        28 Jun 2018 23:59    TPro  8.3    /  Alb  4.9    /  TBili  0.4    /  DBili  x      /  AST  31     /  ALT  20     /  AlkPhos  89     28 Jun 2018 23:59

## 2018-06-29 NOTE — H&P ADULT - HISTORY OF PRESENT ILLNESS
23 year old female p/w left breast pain, swelling and erythema x 5 days. Pt was seen for an abscess twice before, the last was 5/8/18 for which she had needle guided aspiration. Pt states after that time she had no symptoms of recurrence until 5 days ago. She was started on PO Dicloxacillin 2 days ago with no relief, Pt states erythema and pain have been worsening. Pt denies any bleeding, discharge or pus from the site. Denies fever or chills. Denies trauma to breast.

## 2018-06-29 NOTE — CONSULT NOTE ADULT - SUBJECTIVE AND OBJECTIVE BOX
JOSE F HUSSEIN  23y, Female  Allergy: No Known Allergies      HPI:  23 year old female p/w left breast pain, swelling and erythema x 5 days. Pt was seen for an abscess twice before, the last was 5/8/18 for which she had needle guided aspiration. Pt states after that time she had no symptoms of recurrence until 5 days ago. She was started on PO Dicloxacillin 2 days ago with no relief, Pt states erythema and pain have been worsening. Pt denies any bleeding, discharge or pus from the site. Denies fever or chills. Denies trauma to breast. (29 Jun 2018 00:30)    FAMILY HISTORY:    PAST MEDICAL & SURGICAL HISTORY:  Left breast abscess  Status post fine needle aspiration: left breast        VITALS:  T(F): 98.7, Max: 99.2 (06-29-18 @ 07:29)  HR: 98  BP: 99/52  RR: 17Vital Signs Last 24 Hrs  T(C): 37.1 (29 Jun 2018 13:00), Max: 37.3 (29 Jun 2018 07:29)  T(F): 98.7 (29 Jun 2018 13:00), Max: 99.2 (29 Jun 2018 07:29)  HR: 98 (29 Jun 2018 13:00) (76 - 101)  BP: 99/52 (29 Jun 2018 13:00) (92/51 - 121/63)  BP(mean): --  RR: 17 (29 Jun 2018 13:00) (17 - 20)  SpO2: 98% (29 Jun 2018 07:29) (95% - 100%)    TESTS & MEASUREMENTS:                        11.6   15.21 )-----------( 297      ( 29 Jun 2018 13:02 )             33.9     06-28    139  |  97<L>  |  12  ----------------------------<  59<L>  4.4   |  17  |  0.8    Ca    9.7      28 Jun 2018 23:59    TPro  8.3<H>  /  Alb  4.9  /  TBili  0.4  /  DBili  x   /  AST  31  /  ALT  20  /  AlkPhos  89  06-28    LIVER FUNCTIONS - ( 28 Jun 2018 23:59 )  Alb: 4.9 g/dL / Pro: 8.3 g/dL / ALK PHOS: 89 U/L / ALT: 20 U/L / AST: 31 U/L / GGT: x                   RADIOLOGY & ADDITIONAL TESTS:    ANTIBIOTICS:  ampicillin/sulbactam  IVPB 3 Gram(s) IV Intermittent every 6 hours  clindamycin IVPB 600 milliGRAM(s) IV Intermittent every 8 hours  clindamycin IVPB

## 2018-06-29 NOTE — CONSULT NOTE ADULT - BREASTS COMMENTS
Left breast lateral to the nipple a small ulcer with minimal drainage, faint erythema. No nipple retraction or drainage.

## 2018-06-29 NOTE — H&P ADULT - ASSESSMENT
23 year old female with recurrent left breast abscess  -admit  -f/u labs  -iv antibiotics  -pain control  -breast u/s in am  -npo  -ivf  -case discussed with dr. obrien 23 year old female with recurrent left breast abscess s/p needle aspiration of breast left breast abscess 1 month ago:    -admit to surgery service  -f/u labs  -iv antibiotics: Unasyn & vanco for broad spectrum coverage including MRSA  -pain control  -breast u/s in am  -npo @ [facility]  -ivf  -case discussed with dr. obrien 23 year old female with recurrent left breast abscess s/p needle aspiration of breast left breast abscess 1 month ago:    -admit to surgery service  -f/u labs  -iv antibiotics: Unasyn & vanco for broad spectrum coverage including MRSA  -pain control  -breast u/s in am  -npo @ [facility]  -ivf  -case discussed with Dr. Mccormack 23 year old female with recurrent left breast abscess s/p needle aspiration of breast left breast abscess under US guidance on 5/8/18.     -admit to surgery service  -f/u labs  -iv antibiotics: Unasyn & vanco for broad spectrum coverage including MRSA  -pain control  -breast u/s in am  -ivf  -case discussed with Dr. Mccormack

## 2018-06-30 ENCOUNTER — TRANSCRIPTION ENCOUNTER (OUTPATIENT)
Age: 23
End: 2018-06-30

## 2018-06-30 LAB
HCT VFR BLD CALC: 34.8 % — LOW (ref 37–47)
HGB BLD-MCNC: 11.9 G/DL — LOW (ref 12–16)
MCHC RBC-ENTMCNC: 30.5 PG — SIGNIFICANT CHANGE UP (ref 27–31)
MCHC RBC-ENTMCNC: 34.2 G/DL — SIGNIFICANT CHANGE UP (ref 32–37)
MCV RBC AUTO: 89.2 FL — SIGNIFICANT CHANGE UP (ref 81–99)
NRBC # BLD: 0 /100 WBCS — SIGNIFICANT CHANGE UP (ref 0–0)
PLATELET # BLD AUTO: 320 K/UL — SIGNIFICANT CHANGE UP (ref 130–400)
RBC # BLD: 3.9 M/UL — LOW (ref 4.2–5.4)
RBC # FLD: 11.9 % — SIGNIFICANT CHANGE UP (ref 11.5–14.5)
WBC # BLD: 8.19 K/UL — SIGNIFICANT CHANGE UP (ref 4.8–10.8)
WBC # FLD AUTO: 8.19 K/UL — SIGNIFICANT CHANGE UP (ref 4.8–10.8)

## 2018-06-30 RX ADMIN — AMPICILLIN SODIUM AND SULBACTAM SODIUM 200 GRAM(S): 250; 125 INJECTION, POWDER, FOR SUSPENSION INTRAMUSCULAR; INTRAVENOUS at 06:13

## 2018-06-30 RX ADMIN — Medication 250 MILLIGRAM(S): at 06:54

## 2018-06-30 RX ADMIN — Medication 30 MILLIGRAM(S): at 15:55

## 2018-06-30 RX ADMIN — Medication 1 TABLET(S): at 09:55

## 2018-06-30 RX ADMIN — PANTOPRAZOLE SODIUM 40 MILLIGRAM(S): 20 TABLET, DELAYED RELEASE ORAL at 09:28

## 2018-06-30 RX ADMIN — Medication 100 MILLIGRAM(S): at 18:45

## 2018-06-30 RX ADMIN — HEPARIN SODIUM 5000 UNIT(S): 5000 INJECTION INTRAVENOUS; SUBCUTANEOUS at 15:34

## 2018-06-30 RX ADMIN — Medication 1 TABLET(S): at 15:33

## 2018-06-30 RX ADMIN — Medication 1 TABLET(S): at 18:45

## 2018-06-30 NOTE — PROGRESS NOTE ADULT - SUBJECTIVE AND OBJECTIVE BOX
JOSE F HUSSEIN  23y Female   [id number]    Hospital Day: 2  Patient is a 23y old  Female who presents with a chief complaint of left breast abscess (29 Jun 2018 00:30)    PAST MEDICAL & SURGICAL HISTORY:  Left breast abscess  Status post fine needle aspiration: left breast      Events of the Last 24h: Pt continues to have drainage from abscess w/ surrounding erythema. Pt was started on unasyn/vanco per ID recs  Vital Signs Last 24 Hrs  T(C): 35.9 (29 Jun 2018 19:18), Max: 37.3 (29 Jun 2018 07:29)  T(F): 96.6 (29 Jun 2018 19:18), Max: 99.2 (29 Jun 2018 07:29)  HR: 84 (29 Jun 2018 19:18) (76 - 98)  BP: 110/61 (29 Jun 2018 19:18) (92/51 - 121/63)  BP(mean): --  RR: 18 (29 Jun 2018 19:18) (17 - 18)  SpO2: 98% (29 Jun 2018 07:29) (95% - 98%)        Diet, Regular (06-29-18 @ 08:56)      I&O's Summary    28 Jun 2018 07:01  -  29 Jun 2018 07:00  --------------------------------------------------------  IN: 750 mL / OUT: 0 mL / NET: 750 mL     I&O's Detail    28 Jun 2018 07:01  -  29 Jun 2018 07:00  --------------------------------------------------------  IN:    IV PiggyBack: 250 mL    sodium chloride 0.9%: 500 mL  Total IN: 750 mL    OUT:  Total OUT: 0 mL    Total NET: 750 mL      MEDICATIONS  (STANDING):  ampicillin/sulbactam  IVPB 3 Gram(s) IV Intermittent every 6 hours  heparin  Injectable 5000 Unit(s) SubCutaneous every 8 hours  lactobacillus acidophilus 1 Tablet(s) Oral every 8 hours  pantoprazole    Tablet 40 milliGRAM(s) Oral before breakfast  vancomycin  IVPB 1000 milliGRAM(s) IV Intermittent every 12 hours    MEDICATIONS  (PRN):  acetaminophen   Tablet. 650 milliGRAM(s) Oral every 6 hours PRN Mild Pain (1 - 3)  ketorolac   Injectable 30 milliGRAM(s) IV Push every 6 hours PRN Moderate Pain (4 - 6)      PHYSICAL EXAM:  GENERAL: NAD  CHEST/LUNGS: CTAB  HEART: RRR,  No murmurs, rubs, or gallops  ABDOMEN: SNTND +BS  INCISION/WOUNDS: left breast induration above the nipple w/ purulent discharge and surrounding erythema. tender to palp                          11.6   15.21 )-----------( 297      ( 29 Jun 2018 13:02 )             33.9        CBC Full  -  ( 29 Jun 2018 13:02 )  WBC Count : 15.21 K/uL  Hemoglobin : 11.6 g/dL  Hematocrit : 33.9 %  Platelet Count - Automated : 297 K/uL  Mean Cell Volume : 88.5 fL  Mean Cell Hemoglobin : 30.3 pg  Mean Cell Hemoglobin Concentration : 34.2 g/dL  Auto Neutrophil # : 12.76 K/uL  Auto Lymphocyte # : 1.26 K/uL  Auto Monocyte # : 1.06 K/uL  Auto Eosinophil # : 0.03 K/uL  Auto Basophil # : 0.04 K/uL  Auto Neutrophil % : 83.8 %  Auto Lymphocyte % : 8.3 %  Auto Monocyte % : 7.0 %  Auto Eosinophil % : 0.2 %  Auto Basophil % : 0.3 %               139   |  97    |  12                 Ca: 9.7    BMP:   ----------------------------< 59     Mg: x     (06-28-18 @ 23:59)             4.4    |  17    | 0.8                Ph: x        LFT:     TPro: 8.3 / Alb: 4.9 / TBili: 0.4 / DBili: x / AST: 31 / ALT: 20 / AlkPhos: 89   (06-28-18 @ 23:59)    LIVER FUNCTIONS - ( 28 Jun 2018 23:59 )  Alb: 4.9 g/dL / Pro: 8.3 g/dL / ALK PHOS: 89 U/L / ALT: 20 U/L / AST: 31 U/L / GGT: x                 IMAGING:  < from: US Breast Complete, Left (06.29.18 @ 11:32) >  Impression:  Left breast abscess at the 11:00 location 2 - 3cm from the nipple without   significant change and actively draining. Diffuse skin thickening.   Clinical correlation is recommended.  Recommendation: Clinical correlation is advised.  BI-RADS Category 2: Benign

## 2018-06-30 NOTE — DISCHARGE NOTE ADULT - HOSPITAL COURSE
23 year old female p/w left breast pain, swelling and erythema x 5 days. Pt was seen for an abscess twice before, the last was 5/8/18 for which she had needle guided aspiration. Pt states after that time she had no symptoms of recurrence until 5 days ago. She was started on PO Dicloxacillin 2 days ago with no relief, Pt states erythema and pain have been worsening. Pt denies any bleeding, discharge or pus from the site. Denies fever or chills. Denies trauma to breast.  Patient did well, WBC trending down, afebrile, wound with improving cellulitis, and breast ultrasound showing improvement.   Patient was discharged, with instructions for wound care and Augmentin 875 mg q12h and po Doxycycline 100 mg q12h for 10 more days

## 2018-06-30 NOTE — DISCHARGE NOTE ADULT - PLAN OF CARE
instructions for wound care and Augmentin 875 mg q12h and po Doxycycline 100 mg q12h for 10 more days Instructions for wound care with wet to dry dressing (4x4 gauze), with paper tape.  Augmentin 875 mg q12h and po Doxycycline 100 mg q12h for 10 more days

## 2018-06-30 NOTE — DISCHARGE NOTE ADULT - MEDICATION SUMMARY - MEDICATIONS TO TAKE
I will START or STAY ON the medications listed below when I get home from the hospital:    doxycycline hyclate 100 mg oral capsule  -- 1 cap(s) by mouth 2 times a day   -- Avoid prolonged or excessive exposure to direct and/or artificial sunlight while taking this medication.  Do not take this drug if you are pregnant.  Finish all this medication unless otherwise directed by prescriber.  Medication should be taken with plenty of water.    -- Indication: For BREAST ABSCESS    doxycycline hyclate 100 mg oral tablet  -- 1 tab(s) by mouth 2 times a day   -- Avoid prolonged or excessive exposure to direct and/or artificial sunlight while taking this medication.  Do not take this drug if you are pregnant.  Finish all this medication unless otherwise directed by prescriber.  Medication should be taken with plenty of water.    -- Indication: For BREAST ABSCESS    amoxicillin-clavulanate 875 mg-125 mg oral tablet  -- 1 tab(s) by mouth 2 times a day   -- Finish all this medication unless otherwise directed by prescriber.  Take with food or milk.    -- Indication: For Left breast abscess I will START or STAY ON the medications listed below when I get home from the hospital:    doxycycline hyclate 100 mg oral capsule  -- 1 cap(s) by mouth 2 times a day   -- Avoid prolonged or excessive exposure to direct and/or artificial sunlight while taking this medication.  Do not take this drug if you are pregnant.  Finish all this medication unless otherwise directed by prescriber.  Medication should be taken with plenty of water.    -- Indication: For BREAST ABSCESS    amoxicillin-clavulanate 875 mg-125 mg oral tablet  -- 1 tab(s) by mouth 2 times a day   -- Finish all this medication unless otherwise directed by prescriber.  Take with food or milk.    -- Indication: For Left breast abscess

## 2018-06-30 NOTE — DISCHARGE NOTE ADULT - PATIENT PORTAL LINK FT
You can access the [facility] Patient Portal, offered by [facility], by registering with the following website: [email or web address]

## 2018-06-30 NOTE — DISCHARGE NOTE ADULT - CARE PLAN
Principal Discharge DX:	Left breast abscess  Goal:	instructions for wound care and Augmentin 875 mg q12h and po Doxycycline 100 mg q12h for 10 more days  Assessment and plan of treatment:	Instructions for wound care with wet to dry dressing (4x4 gauze), with paper tape.  Augmentin 875 mg q12h and po Doxycycline 100 mg q12h for 10 more days

## 2018-06-30 NOTE — DISCHARGE NOTE ADULT - CARE PROVIDER_API CALL
Kamar Esquivel), Surgery  [address] [place] [address]  [address] [place] [address]  Phone: [phone]  Fax: [phone]

## 2018-06-30 NOTE — PROGRESS NOTE ADULT - SUBJECTIVE AND OBJECTIVE BOX
FINESSE HUSSEINLINE  23y, Female      OVERNIGHT EVENTS:    Feels well, pain less.    VITALS:  T(F): 98.7, Max: 98.7 (06-29-18 @ 13:00)  HR: 86  BP: 96/41  RR: 18Vital Signs Last 24 Hrs  T(C): 37.1 (30 Jun 2018 06:22), Max: 37.1 (29 Jun 2018 13:00)  T(F): 98.7 (30 Jun 2018 06:22), Max: 98.7 (29 Jun 2018 13:00)  HR: 86 (30 Jun 2018 06:22) (82 - 98)  BP: 96/41 (30 Jun 2018 06:22) (92/51 - 110/61)  BP(mean): --  RR: 18 (30 Jun 2018 06:22) (17 - 18)  SpO2: --    TESTS & MEASUREMENTS:                        11.6   15.21 )-----------( 297      ( 29 Jun 2018 13:02 )             33.9     06-28    139  |  97<L>  |  12  ----------------------------<  59<L>  4.4   |  17  |  0.8    Ca    9.7      28 Jun 2018 23:59    TPro  8.3<H>  /  Alb  4.9  /  TBili  0.4  /  DBili  x   /  AST  31  /  ALT  20  /  AlkPhos  89  06-28    LIVER FUNCTIONS - ( 28 Jun 2018 23:59 )  Alb: 4.9 g/dL / Pro: 8.3 g/dL / ALK PHOS: 89 U/L / ALT: 20 U/L / AST: 31 U/L / GGT: x             Culture - Blood (collected 06-29-18 @ 00:07)  Source: .Blood Blood  Preliminary Report (06-30-18 @ 06:01):    No growth to date.    Culture - Blood (collected 06-29-18 @ 00:07)  Source: .Blood Blood  Preliminary Report (06-30-18 @ 06:01):    No growth to date.            RADIOLOGY & ADDITIONAL TESTS:    ANTIBIOTICS:  amoxicillin  875 milliGRAM(s)/clavulanate 1 Tablet(s) Oral every 12 hours  trimethoprim  160 mG/sulfamethoxazole 800 mG 1 Tablet(s) Oral every 12 hours

## 2018-06-30 NOTE — CHART NOTE - NSCHARTNOTEFT_GEN_A_CORE
Patient seen at bedside, Patient was given detailed instructions on discharge regarding wound care and antibiotics regimen. patient appears agitated, uncooperative, combative. stating "Im going to my family come show up". I have spoke with charge nurse to blake Goodman involve

## 2018-06-30 NOTE — PROGRESS NOTE ADULT - ASSESSMENT
23 year old female with recurrent left breast abscess s/p needle aspiration of breast left breast abscess under US guidance on 5/8/18. repeat left breast US was w/o significant change. Pt was started on unasyn/vanco per ID recommendations. Plan to continue IV ABX and PRN dressing changes. No further intervention at this time.
IMPRESSION:  Left breast with subcutaneous abscess with faint cellulitis which has improved with spontaneous drainage.    RECOMMENDATIONS:  Augmentin 875 mg q12h and po Doxycycline 100 mg q12h for 10 more days.  Recall prn please.

## 2018-07-01 VITALS
TEMPERATURE: 97 F | HEART RATE: 61 BPM | RESPIRATION RATE: 20 BRPM | SYSTOLIC BLOOD PRESSURE: 152 MMHG | DIASTOLIC BLOOD PRESSURE: 70 MMHG

## 2018-07-01 LAB
CULTURE RESULTS: SIGNIFICANT CHANGE UP
SPECIMEN SOURCE: SIGNIFICANT CHANGE UP

## 2018-07-06 DIAGNOSIS — N61.0 MASTITIS WITHOUT ABSCESS: ICD-10-CM

## 2018-07-06 DIAGNOSIS — R45.1 RESTLESSNESS AND AGITATION: ICD-10-CM

## 2018-07-06 DIAGNOSIS — N61.1 ABSCESS OF THE BREAST AND NIPPLE: ICD-10-CM

## 2018-07-06 DIAGNOSIS — Z79.2 LONG TERM (CURRENT) USE OF ANTIBIOTICS: ICD-10-CM

## 2018-07-20 ENCOUNTER — APPOINTMENT (OUTPATIENT)
Dept: BREAST CENTER | Facility: CLINIC | Age: 23
End: 2018-07-20
Payer: COMMERCIAL

## 2018-07-20 VITALS
SYSTOLIC BLOOD PRESSURE: 120 MMHG | DIASTOLIC BLOOD PRESSURE: 82 MMHG | HEIGHT: 66 IN | WEIGHT: 108 LBS | BODY MASS INDEX: 17.36 KG/M2

## 2018-07-20 PROCEDURE — 99212 OFFICE O/P EST SF 10 MIN: CPT

## 2018-07-29 ENCOUNTER — FORM ENCOUNTER (OUTPATIENT)
Age: 23
End: 2018-07-29

## 2018-07-30 ENCOUNTER — OUTPATIENT (OUTPATIENT)
Dept: OUTPATIENT SERVICES | Facility: HOSPITAL | Age: 23
LOS: 1 days | Discharge: HOME | End: 2018-07-30

## 2018-07-30 ENCOUNTER — APPOINTMENT (OUTPATIENT)
Dept: BREAST CENTER | Facility: CLINIC | Age: 23
End: 2018-07-30
Payer: COMMERCIAL

## 2018-07-30 VITALS
HEIGHT: 66 IN | SYSTOLIC BLOOD PRESSURE: 100 MMHG | HEART RATE: 67 BPM | OXYGEN SATURATION: 97 % | DIASTOLIC BLOOD PRESSURE: 72 MMHG | BODY MASS INDEX: 17.36 KG/M2 | WEIGHT: 108 LBS

## 2018-07-30 DIAGNOSIS — N63.20 UNSPECIFIED LUMP IN THE LEFT BREAST, UNSPECIFIED QUADRANT: ICD-10-CM

## 2018-07-30 DIAGNOSIS — Z98.890 OTHER SPECIFIED POSTPROCEDURAL STATES: Chronic | ICD-10-CM

## 2018-07-30 PROBLEM — N61.1 ABSCESS OF THE BREAST AND NIPPLE: Chronic | Status: ACTIVE | Noted: 2018-06-29

## 2018-07-30 PROCEDURE — 99213 OFFICE O/P EST LOW 20 MIN: CPT

## 2018-08-03 ENCOUNTER — APPOINTMENT (OUTPATIENT)
Dept: BREAST CENTER | Facility: CLINIC | Age: 23
End: 2018-08-03
Payer: COMMERCIAL

## 2018-08-03 VITALS
WEIGHT: 108 LBS | SYSTOLIC BLOOD PRESSURE: 112 MMHG | BODY MASS INDEX: 17.36 KG/M2 | HEART RATE: 70 BPM | HEIGHT: 66 IN | DIASTOLIC BLOOD PRESSURE: 78 MMHG | OXYGEN SATURATION: 98 %

## 2018-08-03 PROCEDURE — 99212 OFFICE O/P EST SF 10 MIN: CPT

## 2018-08-07 ENCOUNTER — APPOINTMENT (OUTPATIENT)
Dept: BREAST CENTER | Facility: CLINIC | Age: 23
End: 2018-08-07
Payer: COMMERCIAL

## 2018-08-07 VITALS
BODY MASS INDEX: 17.36 KG/M2 | HEART RATE: 68 BPM | DIASTOLIC BLOOD PRESSURE: 70 MMHG | WEIGHT: 108 LBS | HEIGHT: 66 IN | SYSTOLIC BLOOD PRESSURE: 100 MMHG | OXYGEN SATURATION: 98 %

## 2018-08-07 PROCEDURE — 99212 OFFICE O/P EST SF 10 MIN: CPT

## 2018-08-15 ENCOUNTER — APPOINTMENT (OUTPATIENT)
Dept: BREAST CENTER | Facility: CLINIC | Age: 23
End: 2018-08-15

## 2018-08-24 ENCOUNTER — APPOINTMENT (OUTPATIENT)
Dept: BREAST CENTER | Facility: CLINIC | Age: 23
End: 2018-08-24

## 2018-12-24 DIAGNOSIS — N61.1 ABSCESS OF THE BREAST AND NIPPLE: ICD-10-CM

## 2020-01-07 ENCOUNTER — EMERGENCY (EMERGENCY)
Facility: HOSPITAL | Age: 25
LOS: 0 days | Discharge: HOME | End: 2020-01-07
Admitting: EMERGENCY MEDICINE
Payer: MEDICAID

## 2020-01-07 VITALS
DIASTOLIC BLOOD PRESSURE: 64 MMHG | HEIGHT: 67 IN | RESPIRATION RATE: 18 BRPM | WEIGHT: 119.93 LBS | TEMPERATURE: 99 F | SYSTOLIC BLOOD PRESSURE: 123 MMHG | HEART RATE: 94 BPM | OXYGEN SATURATION: 97 %

## 2020-01-07 DIAGNOSIS — Z11.3 ENCOUNTER FOR SCREENING FOR INFECTIONS WITH A PREDOMINANTLY SEXUAL MODE OF TRANSMISSION: ICD-10-CM

## 2020-01-07 DIAGNOSIS — Z98.890 OTHER SPECIFIED POSTPROCEDURAL STATES: Chronic | ICD-10-CM

## 2020-01-07 DIAGNOSIS — N39.0 URINARY TRACT INFECTION, SITE NOT SPECIFIED: ICD-10-CM

## 2020-01-07 DIAGNOSIS — F17.200 NICOTINE DEPENDENCE, UNSPECIFIED, UNCOMPLICATED: ICD-10-CM

## 2020-01-07 LAB
APPEARANCE UR: ABNORMAL
BACTERIA # UR AUTO: ABNORMAL
BILIRUB UR-MCNC: NEGATIVE — SIGNIFICANT CHANGE UP
COLOR SPEC: YELLOW — SIGNIFICANT CHANGE UP
DIFF PNL FLD: NEGATIVE — SIGNIFICANT CHANGE UP
EPI CELLS # UR: 27 /HPF — HIGH (ref 0–5)
GLUCOSE UR QL: NEGATIVE — SIGNIFICANT CHANGE UP
HYALINE CASTS # UR AUTO: 3 /LPF — SIGNIFICANT CHANGE UP (ref 0–7)
KETONES UR-MCNC: NEGATIVE — SIGNIFICANT CHANGE UP
LEUKOCYTE ESTERASE UR-ACNC: ABNORMAL
NITRITE UR-MCNC: POSITIVE
PH UR: 7 — SIGNIFICANT CHANGE UP (ref 5–8)
PROT UR-MCNC: ABNORMAL
RBC CASTS # UR COMP ASSIST: 3 /HPF — SIGNIFICANT CHANGE UP (ref 0–4)
SP GR SPEC: 1.02 — SIGNIFICANT CHANGE UP (ref 1.01–1.02)
UROBILINOGEN FLD QL: SIGNIFICANT CHANGE UP
WBC UR QL: 7 /HPF — HIGH (ref 0–5)

## 2020-01-07 PROCEDURE — 99283 EMERGENCY DEPT VISIT LOW MDM: CPT

## 2020-01-07 RX ORDER — NITROFURANTOIN MACROCRYSTAL 50 MG
1 CAPSULE ORAL
Qty: 10 | Refills: 0
Start: 2020-01-07 | End: 2020-01-11

## 2020-01-07 NOTE — ED PROVIDER NOTE - OBJECTIVE STATEMENT
24 year old female w no significant pmhx presents to the ED requesting STD testing. Patient states she is sexually active with one male partner, does not routinely use protection and is concerned he may have been unfaithful. Patient is also reporting 3 days of dysuria and malodorous urine. Denies fevers/chills, abd pain, nausea, vomiting, diarrhea, abnl vaginal bleeding/discharge, hematuria, frequency, back/flank pain. LMP 1 month ago.

## 2020-01-07 NOTE — ED PROVIDER NOTE - NS ED ROS FT
CONSTITUTIONAL: (-) fevers, (-) chills  GI: (-) nausea, (-) vomiting, (-) diarrhea, (-) abdominal pain  : see HPI    *all other systems negative except as documented above and in the HPI*

## 2020-01-07 NOTE — ED PROVIDER NOTE - PATIENT PORTAL LINK FT
You can access the FollowMyHealth Patient Portal offered by [facility] by registering at the following website: [email or web address]. By joining [employer]’s FollowMyHealth portal, you will also be able to view your health information using other applications (apps) compatible with our system.

## 2020-01-07 NOTE — ED PROVIDER NOTE - CLINICAL SUMMARY MEDICAL DECISION MAKING FREE TEXT BOX
24 year old female w no significant pmhx here requesting STI testing. Also reports dysuria. GC/Chlamydia urine NAAT sent. UA consistent with UTI. antibiotics sent to pharmacy, patient elects to wait for STI result prior to tx. patient verbalizes understanding of return precautions. I have discussed the discharge plan with the patient. The patient agrees with the plan, as discussed.  The patient understands Emergency Department diagnosis is a preliminary diagnosis often based on limited information and that the patient must adhere to the follow-up plan as discussed.  The patient understands that if the symptoms worsen or if prescribed medications do not have the desired/planned effect that the patient may return to the Emergency Department at any time for further evaluation and treatment.

## 2020-01-07 NOTE — ED PROVIDER NOTE - CARE PLAN
Principal Discharge DX:	UTI (urinary tract infection)  Secondary Diagnosis:	Dysuria  Secondary Diagnosis:	Screen for STD (sexually transmitted disease)

## 2020-01-07 NOTE — ED PROVIDER NOTE - PHYSICAL EXAMINATION
VITALS:  I have reviewed the initial vital signs.  GENERAL: Well-developed, well-nourished, in no acute distress. Nontoxic.  PULM: Normal effort. No tachypnea or retractions.  GI: Normal bowel sounds. Abdomen soft and non-distended. Nontender in all four quadrants without rebound or guarding.  : No CVA tenderness b/l.  NEURO: A&Ox3. Speech clear. No gross motor/sensory deficits.

## 2020-01-08 LAB
C TRACH RRNA SPEC QL NAA+PROBE: SIGNIFICANT CHANGE UP
N GONORRHOEA RRNA SPEC QL NAA+PROBE: SIGNIFICANT CHANGE UP
SPECIMEN SOURCE: SIGNIFICANT CHANGE UP

## 2020-03-11 NOTE — PRE-OP CHECKLIST - NS PREOP CHK CHLOROHEX WASH
Additional Notes: Dark spot on toe nail 2mm away from cuticle. Recommended to keep an eye on it. Patient agreed. N/A Detail Level: Simple

## 2020-08-30 ENCOUNTER — EMERGENCY (EMERGENCY)
Facility: HOSPITAL | Age: 25
LOS: 0 days | Discharge: HOME | End: 2020-08-30
Attending: EMERGENCY MEDICINE | Admitting: EMERGENCY MEDICINE
Payer: MEDICAID

## 2020-08-30 VITALS
TEMPERATURE: 98 F | HEART RATE: 70 BPM | SYSTOLIC BLOOD PRESSURE: 99 MMHG | DIASTOLIC BLOOD PRESSURE: 59 MMHG | RESPIRATION RATE: 18 BRPM | OXYGEN SATURATION: 98 %

## 2020-08-30 VITALS
SYSTOLIC BLOOD PRESSURE: 113 MMHG | DIASTOLIC BLOOD PRESSURE: 55 MMHG | TEMPERATURE: 98 F | RESPIRATION RATE: 18 BRPM | OXYGEN SATURATION: 96 % | HEART RATE: 98 BPM

## 2020-08-30 DIAGNOSIS — F17.200 NICOTINE DEPENDENCE, UNSPECIFIED, UNCOMPLICATED: ICD-10-CM

## 2020-08-30 DIAGNOSIS — B34.9 VIRAL INFECTION, UNSPECIFIED: ICD-10-CM

## 2020-08-30 DIAGNOSIS — Z98.890 OTHER SPECIFIED POSTPROCEDURAL STATES: Chronic | ICD-10-CM

## 2020-08-30 DIAGNOSIS — R09.81 NASAL CONGESTION: ICD-10-CM

## 2020-08-30 PROCEDURE — 99282 EMERGENCY DEPT VISIT SF MDM: CPT

## 2020-08-30 RX ORDER — ACETAMINOPHEN 500 MG
650 TABLET ORAL ONCE
Refills: 0 | Status: COMPLETED | OUTPATIENT
Start: 2020-08-30 | End: 2020-08-30

## 2020-08-30 RX ADMIN — Medication 650 MILLIGRAM(S): at 14:42

## 2020-08-30 NOTE — ED PROVIDER NOTE - NS ED ROS FT
Constitutional: + gen weakness, fatigue/body aches. no fever  Eyes: no redness/discharge/pain/vision changes  ENT: + nasal congestion. No ear pain/sore throat  Cardiac: No chest pain, SOB or edema.  Respiratory: No cough or respiratory distress  GI: No nausea, vomiting, diarrhea or abdominal pain.  : No dysuria, frequency, urgency or hematuria  MS: no pain to back or extremities, no loss of ROM, no weakness  Neuro: + mild HA. No LOC.  Skin: No skin rash.  Except as documented in the HPI, all other systems are negative.

## 2020-08-30 NOTE — ED PROVIDER NOTE - NSFOLLOWUPINSTRUCTIONS_ED_ALL_ED_FT
Viral Illness, Adult  Viruses are tiny germs that can get into a person's body and cause illness. There are many different types of viruses, and they cause many types of illness. Viral illnesses can range from mild to severe. They can affect various parts of the body.    Common illnesses that are caused by a virus include colds and the flu. Viral illnesses also include serious conditions such as HIV/AIDS (human immunodeficiency virus/acquired immunodeficiency syndrome). A few viruses have been linked to certain cancers.    What are the causes?  Many types of viruses can cause illness. Viruses invade cells in your body, multiply, and cause the infected cells to malfunction or die. When the cell dies, it releases more of the virus. When this happens, you develop symptoms of the illness, and the virus continues to spread to other cells. If the virus takes over the function of the cell, it can cause the cell to divide and grow out of control, as is the case when a virus causes cancer.    Different viruses get into the body in different ways. You can get a virus by:  Swallowing food or water that is contaminated with the virus.  Breathing in droplets that have been coughed or sneezed into the air by an infected person.  Touching a surface that has been contaminated with the virus and then touching your eyes, nose, or mouth.  Being bitten by an insect or animal that carries the virus.  Having sexual contact with a person who is infected with the virus.  Being exposed to blood or fluids that contain the virus, either through an open cut or during a transfusion.  If a virus enters your body, your body's defense system (immune system) will try to fight the virus. You may be at higher risk for a viral illness if your immune system is weak.    What are the signs or symptoms?  Symptoms vary depending on the type of virus and the location of the cells that it invades. Common symptoms of the main types of viral illnesses include:    Cold and flu viruses     Fever.  Headache.  Sore throat.  Muscle aches.  Nasal congestion.  Cough.  Digestive system (gastrointestinal) viruses     Fever.  Abdominal pain.  Nausea.  Diarrhea.  Liver viruses (hepatitis)     Loss of appetite.  Tiredness.  Yellowing of the skin (jaundice).  Brain and spinal cord viruses     Fever.  Headache.  Stiff neck.  Nausea and vomiting.  Confusion or sleepiness.  Skin viruses     Warts.  Itching.  Rash.  Sexually transmitted viruses     Discharge.  Swelling.  Redness.  Rash.  How is this treated?  Viruses can be difficult to treat because they live within cells. Antibiotic medicines do not treat viruses because these drugs do not get inside cells. Treatment for a viral illness may include:  Resting and drinking plenty of fluids.  Medicines to relieve symptoms. These can include over-the-counter medicine for pain and fever, medicines for cough or congestion, and medicines to relieve diarrhea.  Antiviral medicines. These drugs are available only for certain types of viruses. They may help reduce flu symptoms if taken early. There are also many antiviral medicines for hepatitis and HIV/AIDS.  Some viral illnesses can be prevented with vaccinations. A common example is the flu shot.    Follow these instructions at home:  Medicines     Image   Take over-the-counter and prescription medicines only as told by your health care provider.  If you were prescribed an antiviral medicine, take it as told by your health care provider. Do not stop taking the medicine even if you start to feel better.  Be aware of when antibiotics are needed and when they are not needed. Antibiotics do not treat viruses. If your health care provider thinks that you may have a bacterial infection as well as a viral infection, you may get an antibiotic.  Do not ask for an antibiotic prescription if you have been diagnosed with a viral illness. That will not make your illness go away faster.  Frequently taking antibiotics when they are not needed can lead to antibiotic resistance. When this develops, the medicine no longer works against the bacteria that it normally fights.  General instructions     Drink enough fluids to keep your urine clear or pale yellow.  Rest as much as possible.  Return to your normal activities as told by your health care provider. Ask your health care provider what activities are safe for you.  Keep all follow-up visits as told by your health care provider. This is important.  How is this prevented?  Take these actions to reduce your risk of viral infection:Image  Eat a healthy diet and get enough rest.  Wash your hands often with soap and water. This is especially important when you are in public places. If soap and water are not available, use hand .  Avoid close contact with friends and family who have a viral illness.  If you travel to areas where viral gastrointestinal infection is common, avoid drinking water or eating raw food.  Keep your immunizations up to date. Get a flu shot every year as told by your health care provider.  Do not share toothbrushes, nail clippers, razors, or needles with other people.  Always practice safe sex.  Contact a health care provider if:  You have symptoms of a viral illness that do not go away.  Your symptoms come back after going away.  Your symptoms get worse.  Get help right away if:  You have trouble breathing.  You have a severe headache or a stiff neck.  You have severe vomiting or abdominal pain.  This information is not intended to replace advice given to you by your health care provider. Make sure you discuss any questions you have with your health care provider.

## 2020-08-30 NOTE — ED PROVIDER NOTE - PATIENT PORTAL LINK FT
You can access the FollowMyHealth Patient Portal offered by [facility] by registering at the following website: [email or web address]. By joining [facility]’s FollowMyHealth portal, you will also be able to view your health information using other applications (apps) compatible with our system.

## 2020-08-30 NOTE — ED PROVIDER NOTE - OBJECTIVE STATEMENT
25 year old F no pmhx c/o flu like symptoms x 2 days. + nasal congestion, body aches,  fatigue/weakness, chills. Denies any cough/fever, chest pain, sob, abd pain, n/v/d, decreased po intake, recent travel, urinary symptoms. + tobacco/marijuana use. Pt also requesting preg test.

## 2020-08-30 NOTE — ED PROVIDER NOTE - PHYSICAL EXAMINATION
CONSTITUTIONAL: Well-appearing; well-nourished; in no apparent distress.   EYES: PERRL; EOM intact.   ENT: normal nose; no rhinorrhea; normal pharynx with no tonsillar hypertrophy.   NECK: Supple; non-tender; no cervical lymphadenopathy.   CARDIOVASCULAR: Normal S1, S2; no murmurs, rubs, or gallops.   RESPIRATORY: Normal chest excursion with respiration; breath sounds clear and equal bilaterally; no wheezes, rhonchi, or rales.  GI/: Normal bowel sounds; non-distended; non-tender; no palpable organomegaly.   MS: No evidence of trauma or deformity Normal ROM in all four extremities; non-tender to palpation; distal pulses are normal.   SKIN: Normal for age and race; warm; dry; good turgor; no apparent lesions or exudate.   NEURO/PSYCH: A & O x 4; grossly unremarkable. mood and manner are appropriate.

## 2020-08-30 NOTE — ED PROVIDER NOTE - ATTENDING CONTRIBUTION TO CARE
25yr old female here for eval of 2 days of nasal congestion, flu like sx's for 2 days. no fever, chills, abd pain. pt also requesting pregnancy test. CON: ao x 3, HENMT: clear oropharynx,  neck supple,  CV: rrr, equal pulses b/l, RESP: cta b/l, GI:  soft, nontender, no rebound, no guarding, SKIN: no rash, MSK: no deformities, NEURO: no gross motor or sensory deficit Psychiatric: appropriate mood, appropriate affect    impression flu like illness, pt well appearing, urine preg and dc home

## 2020-10-15 ENCOUNTER — EMERGENCY (EMERGENCY)
Facility: HOSPITAL | Age: 25
LOS: 0 days | Discharge: HOME | End: 2020-10-15
Attending: STUDENT IN AN ORGANIZED HEALTH CARE EDUCATION/TRAINING PROGRAM
Payer: MEDICAID

## 2020-10-15 VITALS
HEIGHT: 67 IN | WEIGHT: 100.09 LBS | TEMPERATURE: 98 F | SYSTOLIC BLOOD PRESSURE: 103 MMHG | DIASTOLIC BLOOD PRESSURE: 51 MMHG | OXYGEN SATURATION: 98 % | HEART RATE: 85 BPM | RESPIRATION RATE: 16 BRPM

## 2020-10-15 DIAGNOSIS — K08.89 OTHER SPECIFIED DISORDERS OF TEETH AND SUPPORTING STRUCTURES: ICD-10-CM

## 2020-10-15 DIAGNOSIS — Z98.890 OTHER SPECIFIED POSTPROCEDURAL STATES: Chronic | ICD-10-CM

## 2020-10-15 PROCEDURE — 99284 EMERGENCY DEPT VISIT MOD MDM: CPT

## 2020-10-15 RX ORDER — IBUPROFEN 200 MG
1 TABLET ORAL
Qty: 12 | Refills: 0
Start: 2020-10-15 | End: 2020-10-18

## 2020-10-15 RX ORDER — AMOXICILLIN 250 MG/5ML
1 SUSPENSION, RECONSTITUTED, ORAL (ML) ORAL
Qty: 21 | Refills: 0
Start: 2020-10-15 | End: 2020-10-21

## 2020-10-15 RX ORDER — KETOROLAC TROMETHAMINE 30 MG/ML
30 SYRINGE (ML) INJECTION ONCE
Refills: 0 | Status: DISCONTINUED | OUTPATIENT
Start: 2020-10-15 | End: 2020-10-15

## 2020-10-15 RX ADMIN — Medication 30 MILLIGRAM(S): at 17:54

## 2020-10-15 NOTE — ED PROVIDER NOTE - NSFOLLOWUPINSTRUCTIONS_ED_ALL_ED_FT
Dental Pain    Dental pain (toothache) may be caused by many things including tooth decay (cavities or caries), abscess or infection, or trauma. If you were prescribed an antibiotic medicine, finish all of it even if you start to feel better. Rinsing your mouth with salt water or applying ice to the painful area of your face may help with the pain. Follow up with a dentist is important in ensuring good oral health and preventing the worsening of dental disease.    SEEK IMMEDIATE MEDICAL CARE IF YOU HAVE ANY OF THE FOLLOWING SYMPTOMS: unable to open your mouth, trouble breathing or swallowing, fever, or swelling of the face, neck, or jaw.    Follow up with your primary medical doctor in 1-2 days

## 2020-10-15 NOTE — ED PROVIDER NOTE - NSFOLLOWUPCLINICS_GEN_ALL_ED_FT
[facility]  Dental  [address] [place] [address]  Phone: [phone]  Fax:   Follow Up Time: 1-3 Days

## 2020-10-15 NOTE — ED PROVIDER NOTE - PHYSICAL EXAMINATION
CONST: Well appearing in NAD  ENT: + TTP tooth #17. mild erythema of buccal mucosa, no abscess, no trismus. no nasal discharge. no facial swelling.  Oropharynx normal appearing, no erythema or exudates. Uvula midline.  NECK: Non-tender, no meningeal signs, supple  SKIN: Warm, dry, no acute rashes. Good turgor

## 2020-10-15 NOTE — ED PROVIDER NOTE - ATTENDING CONTRIBUTION TO CARE
25 yr old f w/ no pmh who presents with dental pain. Pt went to dental clinic today, however, pt was told to come to the ED as her insurance did not cover dental clinic directly. Pt states that for the past couple of days she has been having pain in tooth #17. Pt denies any trismus, difficulty swallowing, fevers, chills, drooling or SOB.   Pt denies any other complaints.     Review of Systems    Constitutional: (-) fever  Cardiovascular: (-) chest pain, (-) syncope  Respiratory: (-) cough, (-) shortness of breath  Gastrointestinal: (-) vomiting, (-) diarrhea, (-) abdominal pain  Musculoskeletal: (-) neck pain, (-) back pain, (-) joint pain  Integumentary: (-) rash, (-) edema  Neurological: (-) headache, (-) altered mental status    Except as documented in the HPI, all other systems are negative.    VITAL SIGNS: I have reviewed nursing notes and confirm.  CONSTITUTIONAL: non-toxic, well appearing  SKIN: no rash, no petechiae.  EYES: PERRL, EOMI, pink conjunctiva, anicteric  ENT: tongue midline, no exudates, MMM. Pain on palpation of tooth #17. no facial swelling.   NECK: Supple; no meningismus, no JVD  CARD: RRR, no murmurs, equal radial pulses bilaterally 2+  RESP: CTAB, no respiratory distress  ABD: Soft, non-tender, non-distended, no peritoneal signs, no HSM, no CVA tenderness  EXT: Normal ROM x4. No edema. No calves tenderness  NEURO: Alert, oriented. CN2-12 intact, equal strength bilaterally, nl gait.  PSYCH: Cooperative, appropriate.    a/p  25 yr old f that presents with dental pain, pt seen by dental in the ED, given a block and instructed to come back to the ED tomorrow so that she can be taken to dental clinic (due to insurance). Pt also discharged with po antibiotics and clear return precautions.

## 2020-10-15 NOTE — ED PROVIDER NOTE - PATIENT PORTAL LINK FT
You can access the FollowMyHealth Patient Portal offered by [facility] by registering at the following website: [email or web address]. By joining [facility]’s FollowMyHealth portal, you will also be able to view your health information using other applications (apps) compatible with our system.

## 2020-10-15 NOTE — CONSULT NOTE ADULT - SUBJECTIVE AND OBJECTIVE BOX
Patient is a 25y old  Female who presents with a chief complaint of tooth pain on the left side and in the jaw radiating to her head    HPI: pain for 3 days    PAST MEDICAL & SURGICAL HISTORY:  Left breast abscess    Status post fine needle aspiration  left breast      ( -  ) heart valve replacement  ( -  ) joint replacement  ( -  ) pregnancy    MEDICATIONS  (STANDING):    MEDICATIONS  (PRN):      Allergies    No Known Allergies    Intolerances              *Last Dental Visit: unknown    Vital Signs Last 24 Hrs  T(C): 36.8 (15 Oct 2020 17:01), Max: 36.8 (15 Oct 2020 17:01)  T(F): 98.3 (15 Oct 2020 17:01), Max: 98.3 (15 Oct 2020 17:01)  HR: 85 (15 Oct 2020 17:01) (85 - 85)  BP: 103/51 (15 Oct 2020 17:01) (103/51 - 103/51)  BP(mean): --  RR: 16 (15 Oct 2020 17:01) (16 - 16)  SpO2: 98% (15 Oct 2020 17:01) (98% - 98%)                    EOE:  TMJ (  - ) clicks                     ( -  ) pops                     ( -  ) crepitus             Mandible <<FROM>>             Facial bones and MOM <<grossly intact>>             ( -  ) trismus             (  - ) lymphadenopathy             ( -  ) swelling             ( -  ) asymmetry             (-   ) palpation             (-   ) dyspnea             ( -  ) dysphagia             (-   ) loss of consciousness    IOE:  permanent dentition           hard/soft palate:   <<No pathology noted>>           tongue/FOM <<No pathology noted>>           labial/buccal mucosa <<No pathology noted>>           ( +  ) percussion :tooth #17 is positive to percussion , #14,15,16,18,19 negative to percussion           ( -  ) palpation           ( +  ) swelling : minor swelling noted in buccal mucosa            ( -  ) abscess           ( -  ) sinus tract    Clinically no caries noted on left posterior teeth          *PLAN: Patient has [employer] dental insurance. Have patient come in through ED to dental clinic next day morning 10/16/2020. Prescribe antibiotics and pain medications. Dental block.     PROCEDURE:   2 carpules of 0.5 %marcaine with 1:50,000 epinephrine given via inferior alveolar nerve block. Patient reports feeling much better    RECOMMENDATIONS:  1) Amoxicillin 500mg and ibuprofen 600mg  2) Emergency visit next morning 10/16/20  3) Dental F/U with outpatient dentist for comprehensive dental care.   4) If any difficulty swallowing/breathing, fever occur, return to ER.     Rosio José, pager #[phone]

## 2020-10-15 NOTE — ED PROVIDER NOTE - OBJECTIVE STATEMENT
25 y.o female presents to the ED for evaluation of L lower dental pain x 3 days.  Gradual onset, constant, worse w/ mastication, mild severity, no radiation of pain.  NO fever, chills, trismus, sore throat, facial swelling. NO further complaints.

## 2020-10-15 NOTE — ED PROVIDER NOTE - NS ED ROS FT
CONST: No fever, chills or bodyaches  EYES: No pain, redness, drainage or visual changes.  ENT: + dentalgia. No ear pain or discharge, nasal discharge or congestion. No sore throat  SKIN: No rashes  NEURO: No headache, dizziness, paresthesias

## 2020-10-15 NOTE — ED PROVIDER NOTE - PROGRESS NOTE DETAILS
Discussed results with pt.  All questions were answered and return precautions discussed.  Pt is asx and comfortable at this time.  Unremarkable re-exam.  No further concerns at this time from pt.  Will follow up with PMD.  Pt understands and agrees with tx plan. will f/u w/ dental clinic tomorrow.

## 2020-10-15 NOTE — ED PROVIDER NOTE - CLINICAL SUMMARY MEDICAL DECISION MAKING FREE TEXT BOX
25 yr old f who presents with dental pain. pt sent to dental clinic for further evaluation. pt given strict return precautions.

## 2021-06-26 ENCOUNTER — EMERGENCY (EMERGENCY)
Facility: HOSPITAL | Age: 26
LOS: 0 days | Discharge: HOME | End: 2021-06-27
Attending: STUDENT IN AN ORGANIZED HEALTH CARE EDUCATION/TRAINING PROGRAM | Admitting: STUDENT IN AN ORGANIZED HEALTH CARE EDUCATION/TRAINING PROGRAM
Payer: MEDICAID

## 2021-06-26 VITALS
OXYGEN SATURATION: 99 % | WEIGHT: 100.09 LBS | DIASTOLIC BLOOD PRESSURE: 52 MMHG | HEIGHT: 67 IN | RESPIRATION RATE: 17 BRPM | HEART RATE: 115 BPM | SYSTOLIC BLOOD PRESSURE: 103 MMHG | TEMPERATURE: 100 F

## 2021-06-26 DIAGNOSIS — R11.2 NAUSEA WITH VOMITING, UNSPECIFIED: ICD-10-CM

## 2021-06-26 DIAGNOSIS — R10.13 EPIGASTRIC PAIN: ICD-10-CM

## 2021-06-26 DIAGNOSIS — Z98.890 OTHER SPECIFIED POSTPROCEDURAL STATES: Chronic | ICD-10-CM

## 2021-06-26 LAB
ALBUMIN SERPL ELPH-MCNC: 4.5 G/DL — SIGNIFICANT CHANGE UP (ref 3.5–5.2)
ALP SERPL-CCNC: 83 U/L — SIGNIFICANT CHANGE UP (ref 30–115)
ALT FLD-CCNC: 18 U/L — SIGNIFICANT CHANGE UP (ref 0–41)
ANION GAP SERPL CALC-SCNC: 13 MMOL/L — SIGNIFICANT CHANGE UP (ref 7–14)
AST SERPL-CCNC: 18 U/L — SIGNIFICANT CHANGE UP (ref 0–41)
BASOPHILS # BLD AUTO: 0.05 K/UL — SIGNIFICANT CHANGE UP (ref 0–0.2)
BASOPHILS NFR BLD AUTO: 0.4 % — SIGNIFICANT CHANGE UP (ref 0–1)
BILIRUB SERPL-MCNC: 0.6 MG/DL — SIGNIFICANT CHANGE UP (ref 0.2–1.2)
BUN SERPL-MCNC: 9 MG/DL — LOW (ref 10–20)
CALCIUM SERPL-MCNC: 9.5 MG/DL — SIGNIFICANT CHANGE UP (ref 8.5–10.1)
CHLORIDE SERPL-SCNC: 100 MMOL/L — SIGNIFICANT CHANGE UP (ref 98–110)
CO2 SERPL-SCNC: 23 MMOL/L — SIGNIFICANT CHANGE UP (ref 17–32)
CREAT SERPL-MCNC: 0.7 MG/DL — SIGNIFICANT CHANGE UP (ref 0.7–1.5)
EOSINOPHIL # BLD AUTO: 0.1 K/UL — SIGNIFICANT CHANGE UP (ref 0–0.7)
EOSINOPHIL NFR BLD AUTO: 0.7 % — SIGNIFICANT CHANGE UP (ref 0–8)
GLUCOSE SERPL-MCNC: 93 MG/DL — SIGNIFICANT CHANGE UP (ref 70–99)
HCT VFR BLD CALC: 39 % — SIGNIFICANT CHANGE UP (ref 37–47)
HGB BLD-MCNC: 13.2 G/DL — SIGNIFICANT CHANGE UP (ref 12–16)
IMM GRANULOCYTES NFR BLD AUTO: 0.4 % — HIGH (ref 0.1–0.3)
LIDOCAIN IGE QN: 19 U/L — SIGNIFICANT CHANGE UP (ref 7–60)
LYMPHOCYTES # BLD AUTO: 1.23 K/UL — SIGNIFICANT CHANGE UP (ref 1.2–3.4)
LYMPHOCYTES # BLD AUTO: 8.6 % — LOW (ref 20.5–51.1)
MCHC RBC-ENTMCNC: 30.4 PG — SIGNIFICANT CHANGE UP (ref 27–31)
MCHC RBC-ENTMCNC: 33.8 G/DL — SIGNIFICANT CHANGE UP (ref 32–37)
MCV RBC AUTO: 89.9 FL — SIGNIFICANT CHANGE UP (ref 81–99)
MONOCYTES # BLD AUTO: 1.21 K/UL — HIGH (ref 0.1–0.6)
MONOCYTES NFR BLD AUTO: 8.5 % — SIGNIFICANT CHANGE UP (ref 1.7–9.3)
NEUTROPHILS # BLD AUTO: 11.63 K/UL — HIGH (ref 1.4–6.5)
NEUTROPHILS NFR BLD AUTO: 81.4 % — HIGH (ref 42.2–75.2)
NRBC # BLD: 0 /100 WBCS — SIGNIFICANT CHANGE UP (ref 0–0)
PLATELET # BLD AUTO: 275 K/UL — SIGNIFICANT CHANGE UP (ref 130–400)
POTASSIUM SERPL-MCNC: 3.9 MMOL/L — SIGNIFICANT CHANGE UP (ref 3.5–5)
POTASSIUM SERPL-SCNC: 3.9 MMOL/L — SIGNIFICANT CHANGE UP (ref 3.5–5)
PROT SERPL-MCNC: 7.2 G/DL — SIGNIFICANT CHANGE UP (ref 6–8)
RBC # BLD: 4.34 M/UL — SIGNIFICANT CHANGE UP (ref 4.2–5.4)
RBC # FLD: 12.1 % — SIGNIFICANT CHANGE UP (ref 11.5–14.5)
SODIUM SERPL-SCNC: 136 MMOL/L — SIGNIFICANT CHANGE UP (ref 135–146)
TROPONIN T SERPL-MCNC: <0.01 NG/ML — SIGNIFICANT CHANGE UP
WBC # BLD: 14.27 K/UL — HIGH (ref 4.8–10.8)
WBC # FLD AUTO: 14.27 K/UL — HIGH (ref 4.8–10.8)

## 2021-06-26 PROCEDURE — 99285 EMERGENCY DEPT VISIT HI MDM: CPT

## 2021-06-26 PROCEDURE — 93010 ELECTROCARDIOGRAM REPORT: CPT

## 2021-06-26 RX ORDER — SODIUM CHLORIDE 9 MG/ML
1000 INJECTION, SOLUTION INTRAVENOUS ONCE
Refills: 0 | Status: COMPLETED | OUTPATIENT
Start: 2021-06-26 | End: 2021-06-26

## 2021-06-26 RX ORDER — FAMOTIDINE 10 MG/ML
20 INJECTION INTRAVENOUS ONCE
Refills: 0 | Status: COMPLETED | OUTPATIENT
Start: 2021-06-26 | End: 2021-06-26

## 2021-06-26 RX ORDER — METOCLOPRAMIDE HCL 10 MG
10 TABLET ORAL ONCE
Refills: 0 | Status: COMPLETED | OUTPATIENT
Start: 2021-06-26 | End: 2021-06-26

## 2021-06-26 RX ORDER — ONDANSETRON 8 MG/1
4 TABLET, FILM COATED ORAL ONCE
Refills: 0 | Status: DISCONTINUED | OUTPATIENT
Start: 2021-06-26 | End: 2021-06-26

## 2021-06-26 RX ORDER — DIPHENHYDRAMINE HYDROCHLORIDE AND LIDOCAINE HYDROCHLORIDE AND ALUMINUM HYDROXIDE AND MAGNESIUM HYDRO
30 KIT ONCE
Refills: 0 | Status: COMPLETED | OUTPATIENT
Start: 2021-06-26 | End: 2021-06-26

## 2021-06-26 RX ADMIN — FAMOTIDINE 20 MILLIGRAM(S): 10 INJECTION INTRAVENOUS at 22:48

## 2021-06-26 RX ADMIN — Medication 10 MILLIGRAM(S): at 22:57

## 2021-06-26 RX ADMIN — SODIUM CHLORIDE 1000 MILLILITER(S): 9 INJECTION, SOLUTION INTRAVENOUS at 22:49

## 2021-06-26 NOTE — ED PROVIDER NOTE - PROGRESS NOTE DETAILS
CP: Patient sleeping comfortably in bed. No further episodes of vomiting. CP: Patient feels improved. Given strict return precautions and follow up with GI. Patient agreeable to plan.

## 2021-06-26 NOTE — ED PROVIDER NOTE - CARE PROVIDER_API CALL
Melo Ponce J  GASTROENTEROLOGY  [address] [place] [address]  Phone: [phone]  Fax: [phone]  Follow Up Time: 1-3 Days

## 2021-06-26 NOTE — ED PROVIDER NOTE - NS ED ROS FT
GEN:  no fever, no chills, no generalized weakness  NEURO:  no headache, no dizziness  ENT: no sore throat, no runny nose  CV:  no chest pain, no palpitations  RESP:  no sob, no cough  GI:  +nausea, +vomiting, +abdominal pain, no diarrhea  :  no dysuria, no urinary frequency, no hematuria  MSK:  +left breast pain, no joint pain, no edema  SKIN:  no rash, no bruising  HEME: no hematochezia, no melena

## 2021-06-26 NOTE — ED PROVIDER NOTE - CLINICAL SUMMARY MEDICAL DECISION MAKING FREE TEXT BOX
26 year old female with a history of breast abscess in the past presents here c/o epigastric pain. Pain has been going on for 2 days 10/10 intermittent spasm like and throbbing with pain that radiates to the left chest Patient endorses vomiting. patient states she's had an endoscopy in the past but does not remember the name of the GI. Patient also endorses daily marijuana use. No fever cough urinary frequency urgency or burning. VS reviewed. Labs imaging egk obtained and reviewed. Patient given medications and felt improvement in pain. Patient a spoken to in detail about results  All questions addressed.  Results of ED work up discussed and patient given a copy of the results. Patient has proper follow up. Return precautions given.

## 2021-06-26 NOTE — ED PROVIDER NOTE - CARE PLAN
Principal Discharge DX:	Epigastric pain   Principal Discharge DX:	Abdominal pain  Secondary Diagnosis:	Epigastric pain

## 2021-06-26 NOTE — ED PROVIDER NOTE - NSFOLLOWUPINSTRUCTIONS_ED_ALL_ED_FT
Epigastric Pain    WHAT YOU NEED TO KNOW:    What do I need to know about epigastric pain? Epigastric pain is felt in the middle of the upper abdomen, between the ribs and the bellybutton. The pain may be mild or severe. Pain may spread from or to another part of your body. Epigastric pain may be a sign of a serious health problem that needs to be treated.     What causes epigastric pain? The cause of your pain may not be known. The following are common causes:   •Inflammation of your stomach, liver, pancreas, or intestines      •Heart problems, such as a heart attack      •Digestion problems, such as indigestion, GERD, or lactose intolerance      •Medical conditions, such as an ulcer, a hernia, irritable bowel syndrome (IBS), or cancer      •A blockage in your bowels or gallbladder      •A bladder infection      •An injury or previous surgery in your abdomen      What other signs and symptoms may I have with epigastric pain? Signs and symptoms will depend on what is causing your pain.  •Nausea, vomiting, bloating, constipation, or diarrhea      •Loss of appetite, weight loss, feeling of fullness as you start to eat      •Movement relieves the pain or makes it worse, or only certain positions are comfortable      •Pain when you eat, or pain that is relieved when you eat or have a bowel movement      •Sore throat or a hoarse voice      How is epigastric pain diagnosed and treated? Your healthcare provider will feel your abdomen to see if it is tender or rigid. He may change or stop any medicine you are taking that is causing your pain. Your pain may go away without treatment, or you may need any of the following:   •Medicines may be given to treat pain or stop vomiting. You may also need medicines to reduce or control stomach acid, or treat an infection.      •Blood or urine tests may show problems such as infection or inflammation. The tests may also show how well your liver is working.      •An x-ray is used to check your kidneys and bladder.      •An ultrasound is used to check your gallbladder for stones or other blockage.      •A bowel movement sample may be tested for blood.       How can I manage my symptoms?   •Keep a record of your symptoms. Include when the pain starts, how long it lasts, and if it is sharp or dull. Also include any foods you ate or activities you did before the pain started. Keep track of anything that helped the pain.       •Eat a variety of healthy foods. Healthy foods include fruits, vegetables, whole-grain breads, low-fat dairy products, beans, lean meats, and fish. Ask if you need to be on a special diet. Certain foods may cause your pain, such as alcohol or foods that are high in fat. You may need to eat smaller meals and to eat more often than usual.      •Drink liquids as directed. Ask how much liquid to drink each day and which liquids are best for you. Do not have drinks that contain alcohol or caffeine.      Call 911 for any of the following:   •You have any of the following signs of a heart attack: ?Squeezing, pressure, or pain in your chest      ?You may also have any of the following: ?Discomfort or pain in your back, neck, jaw, stomach, or arm      ?Shortness of breath      ?Nausea or vomiting      ?Lightheadedness or a sudden cold sweat        •You have severe pain that radiates to your jaw or back.      When should I seek immediate care?   •You have severe pain that starts suddenly and quickly gets worse.      •You cannot have a bowel movement and are vomiting.      •You vomit or cough up blood.      •You see blood in your urine or bowel movement.      •You feel drowsy and your breathing is slower than usual.      When should I contact my healthcare provider?   •You have a fever or chills.      •You have yellowing of your skin or the whites of your eyes.      •You vomit often or several times in a row.       •You lose weight without trying.      •You have symptoms for longer than 2 weeks.      •You have questions or concerns about your condition or care.      CARE AGREEMENT:    You have the right to help plan your care. Learn about your health condition and how it may be treated. Discuss treatment options with your healthcare providers to decide what care you want to receive. You always have the right to refuse treatment.

## 2021-06-26 NOTE — ED PROVIDER NOTE - PATIENT PORTAL LINK FT
You can access the FollowMyHealth Patient Portal offered by [facility] by registering at the following website: [email or web address]. By joining [employer]’s FollowMyHealth portal, you will also be able to view your health information using other applications (apps) compatible with our system.

## 2021-06-26 NOTE — ED ADULT TRIAGE NOTE - CHIEF COMPLAINT QUOTE
Pt took some medication she found in her medicine cabinet a few days ago accidentally, pt finished the entire medication over a few days and now complaining of epigastric pain.

## 2021-06-26 NOTE — ED PROVIDER NOTE - PHYSICAL EXAMINATION
CONSTITUTIONAL: Well-developed; well-nourished; in no acute distress.   SKIN: warm, dry, no erythema or swelling of left breast  HEAD: Normocephalic; atraumatic.  EYES: no conjunctival injection. PERRLA. EOMI.   ENT: No nasal discharge; airway clear.  NECK: Supple; non tender.  CARD: S1, S2 normal; no murmurs, gallops, or rubs. Regular rate and rhythm. No chest tenderness  RESP: No wheezes, rales or rhonchi.  ABD: +mild epigastric tenderness but nontender with distraction. soft nd. BS+ in all 4 quadrants.  EXT: Normal ROM.  No clubbing, cyanosis or edema.   NEURO: Alert, oriented, grossly unremarkable.  PSYCH: Cooperative, appropriate.

## 2021-06-26 NOTE — ED PROVIDER NOTE - OBJECTIVE STATEMENT
27 yo female, no PMHx, presents with epigastric pain x2 days, radiating to her left breast (history of left breast abscess), sharp, constant, worsening over time, no alleviating or aggravating factors, associated with nausea and vomiting. She recently had an endoscopy and was told she had increased acid and was supposed to be on an antacid but has not been taking it. She also complains of a headache due to recent URI symptoms. Denies fevers, chills, shortness of breath, diarrhea, constipation, dysuria.

## 2021-06-26 NOTE — ED PROVIDER NOTE - ATTENDING CONTRIBUTION TO CARE
I personally evaluated the patient. I reviewed the Resident’s or Physician Assistant’s note (as assigned above), and agree with the findings and plan except as documented in my note.  26 year old female with a history of breast abscess in the past presents here c/o epigastric pain. Pain has been going on for 2 days 10/10 intermittent spasm like and throbbing with pain that radiates to the left chest Patient endorses vomiting. patient states she's had an endoscopy in the past but does not remember the name of the GI. Patient also endorses daily marijuana use. No fever cough urinary frequency urgency or burning. LMp 2 weeks ago.  On exam  CONSTITUTIONAL: WA / WN / NAD  HEAD: NCAT  EYES: PERRL; EOMI;   ENT: Normal pharynx; mucous membranes pink/moist, no erythema.  NECK: Supple; no meningeal signs  CARD: RRR; nl S1/S2; no M/R/G.   RESP: Respiratory rate and effort are normal; breath sounds clear and equal bilaterally.  ABD: Soft, ND + epigastric ttp  MSK/EXT: No gross deformities; full range of motion.  SKIN: Warm and dry;   NEURO: AAOx3,  PSYCH: Memory Intact, Normal Affect

## 2021-06-27 VITALS
SYSTOLIC BLOOD PRESSURE: 100 MMHG | DIASTOLIC BLOOD PRESSURE: 50 MMHG | OXYGEN SATURATION: 99 % | HEART RATE: 85 BPM | RESPIRATION RATE: 17 BRPM

## 2021-06-27 LAB — HCG SERPL QL: NEGATIVE — SIGNIFICANT CHANGE UP

## 2021-06-27 PROCEDURE — 71045 X-RAY EXAM CHEST 1 VIEW: CPT | Mod: 26

## 2021-06-27 RX ORDER — OMEPRAZOLE 10 MG/1
1 CAPSULE, DELAYED RELEASE ORAL
Qty: 14 | Refills: 0
Start: 2021-06-27 | End: 2021-07-10

## 2021-06-27 RX ADMIN — DIPHENHYDRAMINE HYDROCHLORIDE AND LIDOCAINE HYDROCHLORIDE AND ALUMINUM HYDROXIDE AND MAGNESIUM HYDRO 30 MILLILITER(S): KIT at 00:05

## 2021-06-27 NOTE — ED ADULT NURSE NOTE - NSIMPLEMENTINTERV_GEN_ALL_ED
Implemented All Universal Safety Interventions:  [place] to call system. Call bell, personal items and telephone within reach. Instruct patient to call for assistance. Room bathroom lighting operational. Non-slip footwear when patient is off stretcher. Physically safe environment: no spills, clutter or unnecessary equipment. Stretcher in lowest position, wheels locked, appropriate side rails in place.

## 2021-06-28 ENCOUNTER — EMERGENCY (EMERGENCY)
Facility: HOSPITAL | Age: 26
LOS: 0 days | Discharge: HOME | End: 2021-06-28
Attending: EMERGENCY MEDICINE | Admitting: EMERGENCY MEDICINE
Payer: MEDICAID

## 2021-06-28 VITALS
WEIGHT: 96.56 LBS | RESPIRATION RATE: 16 BRPM | TEMPERATURE: 100 F | DIASTOLIC BLOOD PRESSURE: 73 MMHG | OXYGEN SATURATION: 99 % | SYSTOLIC BLOOD PRESSURE: 120 MMHG | HEIGHT: 67 IN | HEART RATE: 88 BPM

## 2021-06-28 DIAGNOSIS — Z79.899 OTHER LONG TERM (CURRENT) DRUG THERAPY: ICD-10-CM

## 2021-06-28 DIAGNOSIS — R10.13 EPIGASTRIC PAIN: ICD-10-CM

## 2021-06-28 DIAGNOSIS — B82.9 INTESTINAL PARASITISM, UNSPECIFIED: ICD-10-CM

## 2021-06-28 DIAGNOSIS — Z98.890 OTHER SPECIFIED POSTPROCEDURAL STATES: ICD-10-CM

## 2021-06-28 DIAGNOSIS — Z98.890 OTHER SPECIFIED POSTPROCEDURAL STATES: Chronic | ICD-10-CM

## 2021-06-28 PROCEDURE — 99283 EMERGENCY DEPT VISIT LOW MDM: CPT

## 2021-06-28 RX ORDER — ALBENDAZOLE 200 MG/1
2 TABLET, FILM COATED ORAL
Qty: 12 | Refills: 0
Start: 2021-06-28 | End: 2021-06-30

## 2021-06-28 NOTE — ED PROVIDER NOTE - PHYSICAL EXAMINATION
nad  skin warm, dry  ncat  neck supple  rrr nl s1s2 no mrg  ctab no wrr  abd soft ntnd no palpable masses no rgr  back non-tender no cvat  ext no cce dpi  neuro aaox3 grossly nf exam

## 2021-06-28 NOTE — ED ADULT NURSE NOTE - CHIEF COMPLAINT QUOTE
Presents to ER and was in ER 2 days prior with abd pain. Pt "saw parasites in toilet after BM and was DC told to follow up with GI doctor. I lost two pounds in last two days." Afebrile.

## 2021-06-28 NOTE — ED PROVIDER NOTE - ATTENDING CONTRIBUTION TO CARE
26F p/w possible worms in stool. Seen in ED 2d ago for epigastric pain, with recent outpt EGD showing ?gastritis pt has been non-compliance w/antacids, does not remember exact date of EGD of GI physician's name, unable to locate any results in EMR, in ED 2d ago ekg, cxr, labs wnl, advised to f/u with GI. States she had BM since then and thinks she saw a worm (picture provided by pt, possible worm). States she has seen this in past and has been tx by GI. Denies f/c, nvd, melena, brbpr, rash. No sick contacts, recent travel or abx.     PE:  nad  skin warm, dry  ncat  neck supple  rrr nl s1s2 no mrg  ctab no wrr  abd soft ntnd no palpable masses no rgr  back non-tender no cvat  ext no cce dpi  neuro aaox3 grossly nf exam

## 2021-06-28 NOTE — ED PROVIDER NOTE - PATIENT PORTAL LINK FT
You can access the FollowMyHealth Patient Portal offered by [facility] by registering at the following website: [email or web address]. By joining [email or web address] FollowMyHealth portal, you will also be able to view your health information using other applications (apps) compatible with our system.

## 2021-06-28 NOTE — ED PROVIDER NOTE - OBJECTIVE STATEMENT
26F p/w abd pain. Intermitt epigastric pain, seen in ED 2d ago for same, ekg, cxr, labs wnl, advised to f/y with GI. States she had BM since then and thinks she saw a worm (picture provided by pt, possible worm). States she has seen this in past and has been tx by GI. Also rpts a recent EGD was told she has gastritis has been non-compliance w/antacid prescribed, does not remember exact date of EGD of GI physician's name, unable to locate any results in EMR. Denies f/c, nvd, melena, brbpr, rash. No sick contacts, recent travel or abx.

## 2021-06-28 NOTE — ED PROVIDER NOTE - CARE PROVIDER_API CALL
Taniya Walls (MD)  Gastroenterology  [address] [place] [address]  Phone: [phone]  Fax: [phone]  Follow Up Time:

## 2021-06-28 NOTE — ED ADULT TRIAGE NOTE - MODE OF ARRIVAL
"Foot & Ankle Surgery  May 31, 2019    CC: \"problems w/ pain after being on feet\"    I was asked to see Nancy Bronson regarding the chief complaint by:  Dr. Cardenas    HPI:  Pt is a 50 year old female who presents with above complaint.  Bilateral lower extremity issues x \"1\", describes a deep ache.  Pain 8/10 daily, worse with \"being on feet for long periods\".  Has tried to wear \"tennis shoes w/ inserts when walking a lot\", which has helped.  Bilateral arch pain 2 years ago, started at [employer].  Progressively worsening.  D rScholls inserts help but still painful.  Describes post-static dyskinesia.  Heels main area.  Also describes morning pain.  She also notes bilateral arch peeling skin lesions.  They do not itch but can hurt with pressure from standingk    ROS:   Pos for CC.  The patient denies current nausea, vomiting, chills, fevers, belly pain, calf pain, chest pain or SOB.  Complete remainder of ROS is otherwise neg.    VITALS:    Vitals:    05/31/19 0821   BP: 118/72   Weight: 99.6 kg (219 lb 8 oz)   Height: 1.676 m (5' 6\")       PMH:    Past Medical History:   Diagnosis Date     Anemia      NO ACTIVE PROBLEMS        SXHX:    Past Surgical History:   Procedure Laterality Date     ABDOMEN SURGERY  2012    hysterectomy     C C-SEC ONLY,PREV C-SEC  2001,2004    2     C NONSPECIFIC PROCEDURE  1975    tonsils     C NONSPECIFIC PROCEDURE  1985    jaw surgery     DAVINCI HYSTERECTOMY SUPRACERVICAL  12/4/2012    benign pathProcedure: DAVINCI HYSTERECTOMY SUPRACERVICAL;  Davinci Supracervical Hysterectomy with Bilateral Salpingectomy, Cystoscopy ;  Surgeon: Selene Cardenas DO;  Location: [facility] OR     ENT SURGERY  1975     HEAD & NECK SURGERY  1986    jaw surgery     LAPAROSCOPIC TUBAL LIGATION  2007        MEDS:    Current Outpatient Medications   Medication     fluticasone (FLONASE) 50 MCG/ACT spray     No current facility-administered medications for this visit.        ALL:     Allergies   Allergen " Reactions     No Known Drug Allergies        FMH:    Family History   Problem Relation Age of Onset     Respiratory Father         sleep apnea, hypertension     Family History Negative Mother      Family History Negative Brother         1     Cancer Maternal Grandmother         Hodgkins      Cancer Maternal Grandfather          lung cancer-smoker     Cancer Paternal Grandfather         leukemia     Family History Negative Daughter      Cancer - colorectal Maternal Aunt         -at age of diagnosis -early 40's     Cancer Other         Hodgkins and  faternal twin brother as well      Breast Cancer No family hx of        SocHx:    Social History     Socioeconomic History     Marital status:      Spouse name: Juan José     Number of children: 1     Years of education: 13     Highest education level: Not on file   Occupational History     Occupation:      Comment: [employer].   Social Needs     Financial resource strain: Not on file     Food insecurity:     Worry: Not on file     Inability: Not on file     Transportation needs:     Medical: Not on file     Non-medical: Not on file   Tobacco Use     Smoking status: Former Smoker     Smokeless tobacco: Never Used     Tobacco comment: quit in    Substance and Sexual Activity     Alcohol use: Yes     Comment: social     Drug use: No     Sexual activity: Not Currently     Partners: Male     Birth control/protection: Surgical   Lifestyle     Physical activity:     Days per week: Not on file     Minutes per session: Not on file     Stress: Not on file   Relationships     Social connections:     Talks on phone: Not on file     Gets together: Not on file     Attends Confucianism service: Not on file     Active member of club or organization: Not on file     Attends meetings of clubs or organizations: Not on file     Relationship status: Not on file     Intimate partner violence:     Fear of current or ex partner: Not on file     Emotionally  abused: Not on file     Physically abused: Not on file     Forced sexual activity: Not on file   Other Topics Concern     Parent/sibling w/ CABG, MI or angioplasty before 65F 55M? No   Social History Narrative     Not on file           EXAMINATION:  Gen:   No apparent distress  Neuro:   A&Ox3, no deficits  Psych:    Answering questions appropriately for age and situation with normal affect  Head:    NCAT  Eye:    Visual scanning without deficit  Ear:    Response to auditory stimuli wnl  Lung:    Non-labored breathing on RA noted  Abd:    NTND per patient report  Lymph:    Neg for pitting/non-pitting edema BLE  Vasc:    Pulses palpable, CFT minimally delayed  Neuro:    Light touch sensation intact to all sensory nerve distributions without paresthesias  Derm:    Dry vesicular dermatitis bilateral arch, non-pruritic per patient  MSK:    Left lower extremity - ICN painful, no PF, calcaneus, tibial nerve, Achilles or PT tendon pain.  Right lower extremity - central band of plantar fascia tender, otherwise neg.  Calf:    Neg for redness, swelling or tenderness    Assessment:  50 year old female with left lower extremity Rosenberg's neuritis; right lower extremity plantar fasciosis; bilateral arch vesicular dermatitis      Plan:  Discussed etiologies, anatomy and options  1.  Left lower extremity Rosenberg's neuritis   -Regarding the heel pain, the Plantar Fasciitis handout was dispensed and discussed.  We talked about stretching, resting/activity modification, icing, NSAID/tylenol use as tolerated, inserts, supportive/comfortable shoes and minimizing shoeless walking.    -discussed Achilles, plantar fascial and hamstring stretches  -OTC insert information dispensed and discussed     2.  Right lower extremity plantar fasciosis   -Regarding the heel pain, the Plantar Fasciitis handout was dispensed and discussed.  We talked about stretching, resting/activity modification, icing, NSAID/tylenol use as tolerated, inserts,  supportive/comfortable shoes and minimizing shoeless walking.    -discussed Achilles, plantar fascial and hamstring stretches  -OTC insert information dispensed and discussed     3.  Bilateral arch dry vesicular dermatitis   -Rx for topical cortisone cream  -consider antifungal  -consider Dermatology referral    Follow up:  4 weeks or sooner with acute issues      Patient's medical history was reviewed today    Body mass index is 35.43 kg/m .  Weight management plan: Patient was referred to their PCP to discuss a diet and exercise plan.        Myron Garcia DPM FACFAS FACFAOM  Podiatric Foot & Ankle Surgeon  [facility]  [phone]     Walk in

## 2021-06-28 NOTE — ED PROVIDER NOTE - CLINICAL SUMMARY MEDICAL DECISION MAKING FREE TEXT BOX
possible worms in stool (picture provided by pt) - Rx albendazole, return precautions discussed, op GI F/u

## 2021-06-28 NOTE — ED ADULT TRIAGE NOTE - CHIEF COMPLAINT QUOTE
Presents to ER and was in ER 2 days prior with abd pain. Pt "saw parasites in toilet after BM and was DC told to follow up with GI doctor. I lost two pounds in last two days. Afebrile. Presents to ER and was in ER 2 days prior with abd pain. Pt "saw parasites in toilet after BM and was DC told to follow up with GI doctor. I lost two pounds in last two days." Afebrile.

## 2021-07-02 ENCOUNTER — NON-APPOINTMENT (OUTPATIENT)
Age: 26
End: 2021-07-02

## 2021-07-02 ENCOUNTER — OUTPATIENT (OUTPATIENT)
Dept: OUTPATIENT SERVICES | Facility: HOSPITAL | Age: 26
LOS: 1 days | Discharge: HOME | End: 2021-07-02

## 2021-07-02 ENCOUNTER — APPOINTMENT (OUTPATIENT)
Dept: GASTROENTEROLOGY | Facility: CLINIC | Age: 26
End: 2021-07-02
Payer: MEDICAID

## 2021-07-02 VITALS
HEIGHT: 66 IN | HEART RATE: 79 BPM | WEIGHT: 96 LBS | BODY MASS INDEX: 15.43 KG/M2 | TEMPERATURE: 97.4 F | DIASTOLIC BLOOD PRESSURE: 80 MMHG | SYSTOLIC BLOOD PRESSURE: 130 MMHG

## 2021-07-02 DIAGNOSIS — Z80.0 FAMILY HISTORY OF MALIGNANT NEOPLASM OF DIGESTIVE ORGANS: ICD-10-CM

## 2021-07-02 DIAGNOSIS — R10.13 EPIGASTRIC PAIN: ICD-10-CM

## 2021-07-02 DIAGNOSIS — Z98.890 OTHER SPECIFIED POSTPROCEDURAL STATES: Chronic | ICD-10-CM

## 2021-07-02 DIAGNOSIS — Z87.898 PERSONAL HISTORY OF OTHER SPECIFIED CONDITIONS: ICD-10-CM

## 2021-07-02 DIAGNOSIS — Z78.9 OTHER SPECIFIED HEALTH STATUS: ICD-10-CM

## 2021-07-02 DIAGNOSIS — B82.9 INTESTINAL PARASITISM, UNSPECIFIED: ICD-10-CM

## 2021-07-02 PROCEDURE — 99204 OFFICE O/P NEW MOD 45 MIN: CPT | Mod: GC

## 2021-07-02 RX ORDER — ZINC OXIDE 13 %
CREAM (GRAM) TOPICAL
Qty: 7 | Refills: 0 | Status: COMPLETED | COMMUNITY
Start: 2018-02-26 | End: 2021-07-02

## 2021-07-02 RX ORDER — CLINDAMYCIN HYDROCHLORIDE 300 MG/1
300 CAPSULE ORAL EVERY 6 HOURS
Qty: 28 | Refills: 0 | Status: COMPLETED | COMMUNITY
Start: 2018-05-08 | End: 2021-07-02

## 2021-07-02 RX ORDER — ZINC OXIDE 13 %
CREAM (GRAM) TOPICAL
Qty: 1 | Refills: 0 | Status: COMPLETED | COMMUNITY
Start: 2018-08-07 | End: 2021-07-02

## 2021-07-02 RX ORDER — CLINDAMYCIN HYDROCHLORIDE 300 MG/1
300 CAPSULE ORAL EVERY 6 HOURS
Qty: 28 | Refills: 0 | Status: COMPLETED | COMMUNITY
Start: 2018-08-07 | End: 2021-07-02

## 2021-07-02 RX ORDER — CLINDAMYCIN HYDROCHLORIDE 300 MG/1
300 CAPSULE ORAL EVERY 6 HOURS
Qty: 28 | Refills: 0 | Status: COMPLETED | COMMUNITY
Start: 2018-02-26 | End: 2021-07-02

## 2021-07-02 RX ORDER — CLINDAMYCIN HYDROCHLORIDE 300 MG/1
300 CAPSULE ORAL EVERY 6 HOURS
Qty: 28 | Refills: 0 | Status: COMPLETED | COMMUNITY
Start: 2018-12-24 | End: 2021-07-02

## 2021-07-02 RX ORDER — ZINC OXIDE 13 %
CREAM (GRAM) TOPICAL
Qty: 1 | Refills: 0 | Status: COMPLETED | COMMUNITY
Start: 2018-05-08 | End: 2021-07-02

## 2021-07-02 RX ORDER — CLINDAMYCIN HYDROCHLORIDE 300 MG/1
300 CAPSULE ORAL EVERY 6 HOURS
Qty: 28 | Refills: 0 | Status: COMPLETED | COMMUNITY
Start: 2018-07-30 | End: 2021-07-02

## 2021-07-02 RX ORDER — ZINC OXIDE 13 %
CREAM (GRAM) TOPICAL
Qty: 1 | Refills: 0 | Status: COMPLETED | COMMUNITY
Start: 2018-07-30 | End: 2021-07-02

## 2021-07-02 RX ORDER — ZINC OXIDE 13 %
CREAM (GRAM) TOPICAL
Qty: 1 | Refills: 0 | Status: COMPLETED | COMMUNITY
Start: 2018-12-24 | End: 2021-07-02

## 2021-07-02 RX ORDER — CEPHALEXIN 250 MG/1
250 CAPSULE ORAL 4 TIMES DAILY
Qty: 28 | Refills: 0 | Status: COMPLETED | COMMUNITY
Start: 2018-12-24 | End: 2021-07-02

## 2021-07-02 NOTE — REVIEW OF SYSTEMS
[As Noted in HPI] : as noted in HPI [Abdominal Pain] : abdominal pain [Vomiting] : vomiting [Constipation] : constipation [Heartburn] : heartburn [Negative] : Neurological [Fever] : no fever [Chills] : no chills [Eye Pain] : no eye pain [Earache] : no earache [Skin Lesions] : no skin lesions [Arthralgias] : no arthralgias

## 2021-07-02 NOTE — ASSESSMENT
[FreeTextEntry1] : 27 yo F with no PMHx presents for epigastric abdominal pain .\par \par # Chronic epigastric pain ass/ w/ nausea and occasional vomiting \par - per pt : EGD 3 years ago by Dr Walls :  chronic inflammation.\par - not on any medications at home\par - no hx of PUD , no nsaid or etoh abuse\par - will order stool H. pylori \par \par # Parasites in stools\par #  Weight loss\par - will send for stool studies\par - RTC in 2 weeks \par \par \par

## 2021-07-02 NOTE — PHYSICAL EXAM
[General Appearance - Alert] : alert [General Appearance - In No Acute Distress] : in no acute distress [Sclera] : the sclera and conjunctiva were normal [Outer Ear] : the ears and nose were normal in appearance [Respiration, Rhythm And Depth] : normal respiratory rhythm and effort [Heart Sounds] : normal S1 and S2 [Bowel Sounds] : normal bowel sounds [Abdomen Soft] : soft [] : no hepato-splenomegaly [Abdomen Mass (___ Cm)] : no abdominal mass palpated [No CVA Tenderness] : no ~M costovertebral angle tenderness [Abnormal Walk] : normal gait [Oriented To Time, Place, And Person] : oriented to person, place, and time [FreeTextEntry1] : epigastric tenderness.

## 2021-07-02 NOTE — HISTORY OF PRESENT ILLNESS
[Heartburn] : stable heartburn [Nausea] : stable nausea [Vomiting] : stable vomiting [Diarrhea] : denies diarrhea [Constipation] : denies constipation [Yellow Skin Or Eyes (Jaundice)] : denies jaundice [Abdominal Pain] : abdominal pain worsened [Abdominal Swelling] : denies abdominal swelling [de-identified] : 25 yo F with no PMHx presents for epigastric abdominal pain .\par She noted that years ago her abdominal  pain started as dull epigastric pain, constant , sometimes radiates to the chest. associated with nausea and occasional NBNB vomiting, not related to food or BM. no alleviating factors. \par Also she had recent ED visit for " worms in stool" with crampy lower abdominal pain and diarrhea/constipation with foul smelling stool. She had BM every 3 days. no hx of travel , no sick contacts. \par  She also endorses weight loss 6 pounds over 1 week. \par She denies any hematochezia, melena,

## 2021-07-13 LAB — GI PCR PANEL, STOOL: NORMAL

## 2021-07-16 ENCOUNTER — APPOINTMENT (OUTPATIENT)
Dept: GASTROENTEROLOGY | Facility: CLINIC | Age: 26
End: 2021-07-16
Payer: MEDICAID

## 2021-07-16 ENCOUNTER — NON-APPOINTMENT (OUTPATIENT)
Age: 26
End: 2021-07-16

## 2021-07-16 ENCOUNTER — OUTPATIENT (OUTPATIENT)
Dept: OUTPATIENT SERVICES | Facility: HOSPITAL | Age: 26
LOS: 1 days | Discharge: HOME | End: 2021-07-16

## 2021-07-16 VITALS
BODY MASS INDEX: 15.43 KG/M2 | WEIGHT: 96 LBS | HEART RATE: 69 BPM | SYSTOLIC BLOOD PRESSURE: 108 MMHG | DIASTOLIC BLOOD PRESSURE: 70 MMHG | TEMPERATURE: 96.4 F | HEIGHT: 66 IN

## 2021-07-16 DIAGNOSIS — G89.29 EPIGASTRIC PAIN: ICD-10-CM

## 2021-07-16 DIAGNOSIS — B82.9 INTESTINAL PARASITISM, UNSPECIFIED: ICD-10-CM

## 2021-07-16 DIAGNOSIS — Z98.890 OTHER SPECIFIED POSTPROCEDURAL STATES: Chronic | ICD-10-CM

## 2021-07-16 DIAGNOSIS — R10.13 EPIGASTRIC PAIN: ICD-10-CM

## 2021-07-16 LAB
DEPRECATED O AND P PREP STL: NORMAL
H PYLORI AG STL QL: NOT DETECTED

## 2021-07-16 PROCEDURE — 99214 OFFICE O/P EST MOD 30 MIN: CPT | Mod: GC

## 2021-07-16 RX ORDER — POLYETHYLENE GLYCOL 3350 17 G/17G
17 POWDER, FOR SOLUTION ORAL
Qty: 1 | Refills: 0 | Status: ACTIVE | COMMUNITY
Start: 2021-07-16 | End: 1900-01-01

## 2021-07-16 NOTE — REVIEW OF SYSTEMS
[As Noted in HPI] : as noted in HPI [Abdominal Pain] : abdominal pain [Vomiting] : vomiting [Constipation] : constipation [Heartburn] : heartburn [Negative] : Neurological [Fever] : no fever [Chills] : no chills [Eye Pain] : no eye pain [Earache] : no earache [Arthralgias] : no arthralgias [Skin Lesions] : no skin lesions

## 2021-07-16 NOTE — HISTORY OF PRESENT ILLNESS
[Heartburn] : stable heartburn [Nausea] : stable nausea [Vomiting] : stable vomiting [Diarrhea] : denies diarrhea [Constipation] : denies constipation [Yellow Skin Or Eyes (Jaundice)] : denies jaundice [Abdominal Pain] : abdominal pain worsened [Abdominal Swelling] : denies abdominal swelling [de-identified] : 27 yo F with no PMHx presents for epigastric abdominal pain .\par She noted that years ago her abdominal  pain started as dull epigastric pain, constant , sometimes radiates to the chest. associated with nausea and occasional NBNB vomiting, not related to food or BM. no alleviating factors.  She can tolerates certain kind of foods. \par \par Also she endorses  " worms in stool" with crampy lower abdominal pain and diarrhea/constipation with foul smelling stool for may years. She had BM every 3 days. no hx of travel , no sick contacts. \par  She also endorses weight loss 10 pounds over 1 week. But weight in this visit and last visit was the same ( 96 Lbs).\par She said she had EGD 3 years ago by Dr. Walls , no records available, and it showed some reflux.\par She denies any hematochezia, melena,

## 2021-07-16 NOTE — ASSESSMENT
[FreeTextEntry1] : 25 yo F with no PMHx presents for epigastric abdominal pain .\par \par # Chronic epigastric pain ass/ w/ nausea and occasional vomiting \par - per pt : EGD 3 years ago by Dr Walls :  chronic inflammation.\par - not on any medications at home\par - no hx of PUD , no nsaid or etoh abuse\par - H. pylori stool ag  neg\par - symptoms not related to food\par - can not tolerate certain kind of foods ( garlic, adrienne , etc)\par - schedule for EGD\par \par # Parasites in stools\par - Weight loss ( subjective)\par - GI PCR neg\par - OVA and parasite preliminary was neagtive\par - will repeat the test\par - schedule for colonoscopy\par \par \par \par

## 2021-07-19 DIAGNOSIS — R10.13 EPIGASTRIC PAIN: ICD-10-CM

## 2021-07-19 DIAGNOSIS — B82.9 INTESTINAL PARASITISM, UNSPECIFIED: ICD-10-CM

## 2021-07-28 LAB — DEPRECATED O AND P PREP STL: NORMAL

## 2021-10-21 ENCOUNTER — APPOINTMENT (OUTPATIENT)
Dept: GASTROENTEROLOGY | Facility: CLINIC | Age: 26
End: 2021-10-21

## 2023-02-04 NOTE — ED ADULT TRIAGE NOTE - TEMPERATURE IN FAHRENHEIT (DEGREES F)
Patient : Jeannette Bowie Age: 89 year old Sex: female   MRN: [id number] Encounter Date: 2/3/2023  E13/13    History     Chief Complaint   Patient presents with   • Syncope       HPI    Jeannette Bowie is a 89 year old presenting to the emergency department via EMS from home with dizziness that began PTA. There was no head injury. The pt states she is unsure if there were syncopal episodes. She has had syncopal episodes in the past. She lives at home with a caregiver. The pt also reports decreased appetite and nausea. There are no further complaints or modifying factors.    Per EMS, the caregiver reported x3 episodes of syncope on the toilet. When EMS arrived the pt was A&O x4 and reported dizziness. She is wheelchair bound at baseline.        Allergies   Allergen Reactions   • Albuterol      BP decrease faint light headed   • Nitroglycerin      severe hypotension   • Nsaids DIARRHEA     stomach problems  Gastric ulcer     • Penicillins RASH     ? reaction  4/19/2022 - As family  Reaction:  rash    How quickly did the reaction occur?:  unknown   When did the reaction occur?:      How was the reaction managed?: unknown    Beta-lactams taken since the reaction:     Has the patient had penicillin skin testing?: No     • Salicylates Other (See Comments)     stomach problems  stomach problems   • Sulfabenzamide SWELLING   • Sulfa Antibiotics RASH     rash       No current facility-administered medications for this encounter.     Current Outpatient Medications   Medication Sig   • carvedilol (COREG) 6.25 MG tablet Take 1 tablet by mouth in the morning and 1 tablet in the evening.   • blood glucose (OneTouch Ultra) test strip Use to test blood glucose 1-2 x daily. Dx: DM DIABETES UNCOMPL ADULT-TYPE II E11.9   • simvastatin (ZOCOR) 20 MG tablet Take 1 tablet by mouth nightly.   • rOPINIRole (REQUIP) 0.25 MG tablet Take 3 tablets by mouth nightly. Dose: 3 tabs (=0.75mg)   • pantoprazole (Protonix) 40 MG tablet Take 1  tablet by mouth daily (before breakfast).   • furosemide (LASIX) 20 MG tablet Take 1 tablet by mouth daily.   • losartan (COZAAR) 50 MG tablet TAKE 1 TABLET BY MOUTH AT NOON AND AT BEDTIME   • diclofenac (VOLTAREN) 1 % gel Apply 2 g topically 4 times daily as needed (pain).   • Oral Electrolytes (Liquid I.V.) Pack Take 1 packet by mouth daily.   • Multiple Vitamins-Minerals (vitamin - therapeutic multivitamins w/minerals) tablet Take 1 tablet by mouth daily.   • primidone (MYSOLINE) 50 MG tablet Take 2 tablets by mouth 3 times daily.   • lidocaine (LIDOCARE) 4 % patch Place 1 patch onto the skin every 12 hours.   • nitroGLYcerin (Nitrostat) 0.4 MG sublingual tablet Place 1 tablet under the tongue 1 time as needed for Chest pain.   • acetaminophen (TYLENOL) 500 MG tablet Take 500 mg by mouth every 6 hours as needed for Pain (shoulder pain).    • aspirin (ECOTRIN) 81 MG EC tablet Take 81 mg by mouth nightly.    • cholecalciferol (VITAMIN D) 25 mcg (1,000 units) tablet Take 25 mcg by mouth daily.        Past Medical History:   Diagnosis Date   • Acquired pes planus 7/7/2011   • Arthritis    • Atrophic gastritis without mention of hemorrhage 3/9/09    bx showed chronic gastritis, H Pylori negative   • Calculus of gallbladder without mention of cholecystitis or obstruction 2/5/2010   • Chronic kidney disease, stage III (moderate) (CMS/[facility]) 1/15/2008   • Closed fracture of metatarsal bone(s) 12/05   • Contracture of tendon (sheath) 9/9/2011   • Contusion of right hip 10/19/2016   • Diabetic neuropathy (CMS/[facility]) 4/22/2013   • Diabetic polyneuropathy (CMS/[facility]) 7/7/2011   • Diverticulitis of colon (without mention of hemorrhage)(562.11) 11/15/05   • Diverticulosis of colon (without mention of hemorrhage) 2/23/04   • Diverticulosis of colon (without mention of hemorrhage) 6/20/05,10/12/05; 3/9/09    significant disease   • Diverticulosis of colon (without mention of hemorrhage) 8/27/2009    Involving residual left and right  colon   • DM [DIABETES UNCOMPL ADULT-TYPE II] 3/23/2000   • GERD [REFLUX ESOPHAGITIS] 3/23/2000   • Hammertoe 4/22/2013   • hyperactive airways with positive methacholine challenge 11/4/2002   • IBS [IRRITABLE COLON] 3/23/2000   • Iron deficiency anemia, unspecified 2/23/04   • Malignant neoplasm of kidney, except pelvis 5/21/2008    Hx renal cell ca s/p R Nx   • Malignant neoplasm of renal pelvis (CMS/[facility]) 3/23/2000   • Microalbuminuria 10/18/2010   • Other and unspecified hyperlipidemia 8/30/2004   • Palpitations 8/10/2012   • Paroxysmal atrial fibrillation (CMS/[facility]) 4/11/2017    Documented AF episode at [facility].  Pt was expericing CP and had syncope with the episode.    • SIADH (syndrome of inappropriate ADH production) (CMS/[facility]) 10/6/2015   • Tibialis tendinitis 7/7/2011   • TREMOR(aka ABN INVOLUN MOVEMENT NEC) 3/23/2000   • Unspecified essential hypertension 4/26/2005   • Unspecified hemorrhoids without mention of complication 2/23/04; 3/9/09    Internal       Past Surgical History:   Procedure Laterality Date   • Colonoscopy diagnostic  2/23/04    Dr. Butterfield   • Colonoscopy diagnostic  6/20/05    Dr. Butterfield - recall as needed   • Colonoscopy w biopsy  3/9/09 recall as needed    Dr. Butterfield   • Esophagogastroduodenoscopy transoral flex w/bx single or mult  2/23/04    Dr. Butterfield   • Esophagogastroduodenoscopy transoral flex w/bx single or mult  3/9/09    Dr. Butterfield   • Lap colectomy prt w/an/colproc  11/15/05    Dr. Amor Lemus   • Laparoscopic mobil splen flex  11/15/05   • Left heart cath,percutaneous  8/28/02    Cardiac Cath at [facility] that was normal with EF of 69%   • Length/short leg/ankl tendon,single  9/9/2011    L vulpius HC lengthening   • Orif femur fracture Left 07/19/2019   • Osteotomy heel bone  9/09/2011    osteotomy Lt os calcis   • Past surgical history  1997    Nephrectomy, right; adrenal gland CA   • Past surgical history  1960    hemorrhoidectomy   • Repair flex foot tendon,ea   9/09/2011    Gretchen Lt PTT and transfer FDL   • Tilt/possible ep study  9/30/13   • Total abdom hysterectomy  1970    SAULO w BSO       Family History   Problem Relation Age of Onset   • Cancer Father         lung   • Cancer Mother 55        breast   • Cancer Sister         stomach and breast   • Diabetes Sister    • Diabetes Paternal Aunt    • Heart Sister         later in life   • Cancer Sister    • Cancer Maternal Uncle         thyroid   • Cancer Daughter         cancer of spine that caused paralysis primary unspecified   • Cancer Son         unspecified cancer of the eye probably not melanoma       Social History     Tobacco Use   • Smoking status: Never   • Smokeless tobacco: Never   Vaping Use   • Vaping Use: never used   Substance Use Topics   • Alcohol use: Not Currently   • Drug use: No       Review of Systems     Review of Systems   Constitutional: Positive for appetite change (decreased). Negative for chills and fever.   HENT: Negative for congestion and sore throat.    Eyes: Negative for pain and redness.   Respiratory: Negative for cough and shortness of breath.    Cardiovascular: Negative for chest pain and leg swelling.   Gastrointestinal: Positive for nausea. Negative for abdominal pain.   Genitourinary: Negative for difficulty urinating and dysuria.   Musculoskeletal: Negative for arthralgias and myalgias.   Skin: Negative for color change and rash.   Neurological: Positive for dizziness. Negative for weakness and headaches.       Physical Exam     ED Triage Vitals [02/03/23 2226]   ED Triage Vitals Group      Temp 99.1 °F (37.3 °C)      Heart Rate 61      Resp 17      BP (!) 155/68      SpO2 96 %      EtCO2 mmHg       Height       Weight 130 lb (59 kg)      Weight Scale Used ED Stated      BMI (Calculated)       IBW/kg (Calculated)        Physical Exam  Vitals and nursing note reviewed.   Constitutional:       Appearance: She is well-developed.   HENT:      Head: Normocephalic and atraumatic.       Mouth/Throat:      Mouth: Mucous membranes are dry.   Eyes:      General: No scleral icterus.     Pupils: Pupils are equal, round, and reactive to light.   Cardiovascular:      Rate and Rhythm: Normal rate and regular rhythm.   Pulmonary:      Effort: Pulmonary effort is normal.      Breath sounds: Normal breath sounds.   Abdominal:      General: There is no distension.      Palpations: Abdomen is soft.      Tenderness: There is no abdominal tenderness.   Musculoskeletal:         General: No tenderness.      Cervical back: Neck supple.   Lymphadenopathy:      Cervical: No cervical adenopathy.   Skin:     General: Skin is warm and dry.      Findings: No erythema.   Neurological:      Mental Status: She is alert and oriented to person, place, and time.   Psychiatric:         Behavior: Behavior normal.         Procedures     Procedures    Lab Results     Results for orders placed or performed during the hospital encounter of 02/03/23   Comprehensive Metabolic Panel   Result Value Ref Range    Fasting Status      Sodium 128 (L) 135 - 145 mmol/L    Potassium 4.5 3.4 - 5.1 mmol/L    Chloride 96 (L) 97 - 110 mmol/L    Carbon Dioxide 29 21 - 32 mmol/L    Anion Gap 8 7 - 19 mmol/L    Glucose 141 (H) 70 - 99 mg/dL    BUN 55 (H) 6 - 20 mg/dL    Creatinine 1.03 (H) 0.51 - 0.95 mg/dL    Glomerular Filtration Rate 49 (L) >=60    BUN/ Creatinine Ratio 53 (H) 7 - 25    Calcium 9.1 8.4 - 10.2 mg/dL    Bilirubin, Total 0.4 0.2 - 1.0 mg/dL    GOT/AST 23 <=37 Units/L    GPT/ALT 22 <64 Units/L    Alkaline Phosphatase 132 (H) 45 - 117 Units/L    Albumin 3.3 (L) 3.6 - 5.1 g/dL    Protein, Total 6.5 6.4 - 8.2 g/dL    Globulin 3.2 2.0 - 4.0 g/dL    A/G Ratio 1.0 1.0 - 2.4   TROPONIN I, HIGH SENSITIVITY   Result Value Ref Range    Troponin I, High Sensitivity 19 <52 ng/L   CBC with Automated Differential (performable only)   Result Value Ref Range    WBC 7.4 4.2 - 11.0 K/mcL    RBC 3.09 (L) 4.00 - 5.20 mil/mcL    HGB 10.3 (L) 12.0 - 15.5  g/dL    HCT 29.8 (L) 36.0 - 46.5 %    MCV 96.4 78.0 - 100.0 fl    MCH 33.3 26.0 - 34.0 pg    MCHC 34.6 32.0 - 36.5 g/dL    RDW-CV 14.6 11.0 - 15.0 %    RDW-SD 51.5 (H) 39.0 - 50.0 fL     140 - 450 K/mcL    NRBC 0 <=0 /100 WBC    Neutrophil, Percent 80 %    Lymphocytes, Percent 8 %    Mono, Percent 10 %    Eosinophils, Percent 2 %    Basophils, Percent 0 %    Immature Granulocytes 0 %    Absolute Neutrophils 5.9 1.8 - 7.7 K/mcL    Absolute Lymphocytes 0.6 (L) 1.0 - 4.0 K/mcL    Absolute Monocytes 0.8 0.3 - 0.9 K/mcL    Absolute Eosinophils  0.1 0.0 - 0.5 K/mcL    Absolute Basophils 0.0 0.0 - 0.3 K/mcL    Absolute Immmature Granulocytes 0.0 0.0 - 0.2 K/mcL   NT proBNP   Result Value Ref Range    NT-proBNP 963 (H) <=450 pg/mL   ISTAT8 VENOUS  POINT OF CARE   Result Value Ref Range    BUN - POINT OF CARE 51 (H) 6 - 20 mg/dL    SODIUM - POINT OF CARE 129 (L) 135 - 145 mmol/L    POTASSIUM - POINT OF CARE 4.6 3.4 - 5.1 mmol/L    CHLORIDE - POINT OF CARE 94 (L) 97 - 110 mmol/L    TCO2 - POINT OF CARE 27 (H) 19 - 24 mmol/L    ANION GAP - POINT OF CARE 14 7 - 19 mmol/L    HEMATOCRIT - POINT OF CARE 34.0 (L) 36.0 - 46.5 %    HEMOGLOBIN - POINT OF CARE 11.6 (L) 12.0 - 15.5 g/dL    GLUCOSE - POINT OF CARE 134 (H) 70 - 99 mg/dL    CALCIUM, IONIZED - POINT OF CARE 1.20 1.15 - 1.29 mmol/L    Creatinine 1.20 (H) 0.51 - 0.95 mg/dL    Glomerular Filtration Rate 41 (L) >=60   TROPONIN I  POINT OF CARE   Result Value Ref Range    TROPONIN I - POINT OF CARE <0.10 <0.10 ng/mL       EKG     [facility]use EKG report   Results for orders placed or performed during the hospital encounter of 02/03/23   Electrocardiogram 12-Lead   Result Value Ref Range    Systolic Blood Pressure 155     Diastolic Blood Pressure 68     Ventricular Rate EKG/Min (BPM) 63     Atrial Rate (BPM) 63     CO-Interval (MSEC) 260     QRS-Interval (MSEC) 78     QT-Interval (MSEC) 394     QTc 403     P Axis (Degrees) 87     R Axis (Degrees) 42     T Axis (Degrees) 77      REPORT TEXT       Sinus rhythm  with 1st degree AV block  Otherwise normal ECG  When compared with ECG of  29-SEP-2022 17:49,  No significant change was found  Confirmed by JUAN ARZOLA MD ([id number]),  Abel Rojo ([id number]) on 2/3/2023 10:35:01 PM       Radiology Results     Imaging Results          XR CHEST AP OR PA - PORTABLE (Final result)  Result time 02/03/23 23:25:30    Final result                 Impression:    FINDINGS/IMPRESSION:      The heart size and central vasculature are stable. Minimal patchy density  at the left lung base that may represent atelectasis. There is no evidence  of dense focal consolidation or significant effusion.               Narrative:    EXAM: XR CHEST AP OR PA    INDICATION:  syncope    COMPARISON:  9/13/2022.                                ED Medications     Medications   sodium chloride (NORMAL SALINE) 0.9 % bolus 500 mL (0 mLs Intravenous Completed 2/4/23 0029)         ED Course     Vitals:    02/03/23 2226 02/03/23 2230 02/04/23 0000 02/04/23 0001   BP: (!) 155/68 (!) 144/65  (!) 178/70   BP Location: LUE - Left upper extremity      Patient Position: Sitting/High-Caal's      Pulse: 61 61 64    Resp: 17 15 18    Temp: 99.1 °F (37.3 °C)      TempSrc: Oral      SpO2: 96% 96% 95%    Weight: 59 kg (130 lb)          ED Course as of 02/04/23 0037   Fri Feb 03, 2023   2221 Initial Impression: The pt is a 89 year old female who presents to the ED today with dizziness. Labs, UA, EKG, and CXR will be ordered to assess the pt's condition. Plan to administer fluids for sx relief. Pt understands and agrees with the plan. All questions addressed.  [ZP]   Sat Feb 04, 2023   0017 I reassessed the patient, her daughter is at bedside.  I discussed her workup which reveals dose of significant abnormality.  Patient reports her symptoms have improved.  Patient her daughter confirmed that she had 1 syncopal episode while she was sitting on the toilet.  She denies chest pain or  palpitations.  I discussed disposition options including admission to the hospital for telemetry monitoring, the patient would strongly prefer discharge home.  Recommending outpatient follow-up and return to the ED if symptoms are worsening. [MD]      ED Course User Index  [MD] Hima Simpson MD  [CONRADP] Abel Rojo       Cardiac Monitor Review: 12:37 AM  I have independently reviewed the patients cardiac monitor. The patient's rhythm shows sinus bradycardia  with rate of 64 bpm.          Consults                    MDM                            89-year-old presenting with syncopal episode while on the toilet.  Reported dizziness, improved with IV fluids.  Her daughter states that per EMS her blood pressure was lower than it is currently.  Suspect vasovagal episode given that symptoms happened on the toilet.  Considered MI, however troponin is negative and ECG does not have any scheme is changes.  She denies chest pain or shortness of breath.  Low suspicion for acute cardiopulmonary process.  She was monitored on telemetry, no abnormality.  BNP not significantly elevated.  Discussed with her and her family.  Patient reports significant improvement after IV fluids in the ED. description of syncope is not high suspicion for cardiac etiology.  I discussed disposition options including admission to the hospital to monitoring versus outpatient follow-up.  Patient would prefer to be discharged home at this point.  I discussed that she could return to the ED at any time if symptoms are worsening.    Critical Care       No Critical Care        Disposition       Clinical Impression and Diagnosis  12:39 AM       ED Diagnosis     Diagnosis Comment Associated Orders       Final diagnosis    Syncope, unspecified syncope type -- --          The patient was provided with a recommendation to follow up with a primary care provider and obtain reassessment of his/her blood pressure within three months.    Follow Up:  Nadiya Baeza,  MD  [address]   [place] [address]  [phone]    Call       [facility]  [address]  [facility] [id number] [phone]    If symptoms worsen          Summary of your Discharge Medications      You have not been prescribed any medications.         Pt is discharged to home/self care in stable condition.              Discharge 2/4/2023 12:18 AM  Jeannette Bowie discharge to home/self care.              I have reviewed the information recorded by the scribe for accuracy and agree with its contents.    ____________________________________________________________________    Abel Rojo acting as a scribe for Dr. Hima Simpson  Dictation # [id number]  Scribe: Abel Simpson MD  02/04/23 0039     96.7

## 2023-06-27 NOTE — ED ADULT NURSE NOTE - PSH
FMLA or Disability Forms were received and faxed to the Forms Completion Department today at [phone]. (Please include all appropriate authorization forms with your fax).    Did you have the patient complete (in full) and sign the “Authorization For Disclosure of Health Information Forms Completion” form? No, Reason: Received via fax.      DUE TO VERY HIGH FORM VOLUMES, please communicate to the patient that the Forms Completion team estimates their form may take longer than our usual 14 business day turnaround goal.    If you have questions about this encounter, please contact the Forms Completion Dept at [phone], Option 3.   No significant past surgical history

## 2024-04-30 NOTE — CONSULT NOTE ADULT - CONSULT REQUESTED BY NAME
EMERGENCY DEPARTMENT ENCOUNTER    Pt Name: Yg Araujo  MRN: [id number]  Pt :   1980  Room Number:    Date of encounter:  2024  PCP: Provider, No Known  ED Provider: Miky Tse MD    Historian: Patient      HPI:  Chief Complaint: Abdominal pain, nausea, history of pancreatitis        Context: Yg Araujo is a 44-year-old man who presents because of nausea vomiting diarrhea and severe epigastric pain.  Symptoms started a couple of weeks ago he initially thought it was food poisoning from some steak.  Symptoms of gotten worse today the symptoms are similar to his prior episode of pancreatitis but at that time it was believed to be caused by alcohol and he says he has not been drinking.  No appreciated fevers.  No other complaints at this time.      PAST MEDICAL HISTORY  Past Medical History:   Diagnosis Date    Alcohol abuse     Diverticulitis     GERD (gastroesophageal reflux disease)     HLD (hyperlipidemia)     HTN (hypertension)     Pancreatitis     Pancreatitis     Polysubstance abuse     Type 2 diabetes mellitus          PAST SURGICAL HISTORY  History reviewed. No pertinent surgical history.      FAMILY HISTORY  Family History   Problem Relation Age of Onset    Hypertension Father     Heart disease Father     Diabetes Father          SOCIAL HISTORY  Social History     Socioeconomic History    Marital status: Single   Tobacco Use    Smoking status: Every Day     Current packs/day: 1.50     Average packs/day: 1.5 packs/day for 29.3 years (44.0 ttl pk-yrs)     Types: Cigarettes     Start date: 1995    Smokeless tobacco: Never   Vaping Use    Vaping status: Never Used   Substance and Sexual Activity    Alcohol use: Yes     Alcohol/week: 24.0 standard drinks of alcohol     Types: 24 Cans of beer per week     Comment: 6 pack every other day    Drug use: Yes     Types: Marijuana, Cocaine(coke)     Comment: daily- Hasn't done cocaine since beginning of 2017    Sexual  ed activity: Defer         ALLERGIES  Patient has no known allergies.        REVIEW OF SYSTEMS  Review of Systems       All systems reviewed and negative except for those discussed in HPI.       PHYSICAL EXAM    I have reviewed the triage vital signs and nursing notes.    ED Triage Vitals [04/30/24 1033]   Temp Heart Rate Resp BP SpO2   98.7 °F (37.1 °C) 82 17 115/86 97 %      Temp src Heart Rate Source Patient Position BP Location FiO2 (%)   Oral Monitor Sitting Left arm --       Physical Exam  GENERAL:   Appears in no acute distress.   HENT: Nares patent.  Mildly dry mucous membrane  EYES: No scleral icterus.  CV: Regular rhythm, regular rate.  RESPIRATORY: Normal effort.  No audible wheezes, rales or rhonchi.  ABDOMEN: Soft, endorses tenderness in the epigastrium and left upper quadrant without rigidity or guarding  MUSCULOSKELETAL: No deformities.   NEURO: Alert, moves all extremities, follows commands.  SKIN: Warm, dry, no rash visualized.      LAB RESULTS  Recent Results (from the past 24 hour(s))   Comprehensive Metabolic Panel    Collection Time: 04/30/24 10:49 AM    Specimen: Blood   Result Value Ref Range    Glucose 254 (H) 65 - 99 mg/dL    BUN 11 6 - 20 mg/dL    Creatinine 0.71 (L) 0.76 - 1.27 mg/dL    Sodium 137 136 - 145 mmol/L    Potassium 4.0 3.5 - 5.2 mmol/L    Chloride 100 98 - 107 mmol/L    CO2 27.0 22.0 - 29.0 mmol/L    Calcium 9.6 8.6 - 10.5 mg/dL    Total Protein 7.4 6.0 - 8.5 g/dL    Albumin 4.3 3.5 - 5.2 g/dL    ALT (SGPT) 15 1 - 41 U/L    AST (SGOT) 18 1 - 40 U/L    Alkaline Phosphatase 74 39 - 117 U/L    Total Bilirubin 1.1 0.0 - 1.2 mg/dL    Globulin 3.1 gm/dL    A/G Ratio 1.4 g/dL    BUN/Creatinine Ratio 15.5 7.0 - 25.0    Anion Gap 10.0 5.0 - 15.0 mmol/L    eGFR 116.7 >60.0 mL/min/1.73   Lipase    Collection Time: 04/30/24 10:49 AM    Specimen: Blood   Result Value Ref Range    Lipase 264 (H) 13 - 60 U/L   Urinalysis With Microscopic If Indicated (No Culture) - Urine, Clean Catch     Collection Time: 04/30/24 10:49 AM    Specimen: Urine, Clean Catch   Result Value Ref Range    Color, UA Dark Yellow (A) Yellow, Straw    Appearance, UA Clear Clear    pH, UA 5.5 5.0 - 8.0    Specific Gravity, UA 1.038 (H) 1.001 - 1.030    Glucose, UA >=1000 mg/dL (3+) (A) Negative    Ketones, UA Trace (A) Negative    Bilirubin, UA Small (1+) (A) Negative    Blood, UA Negative Negative    Protein, UA 30 mg/dL (1+) (A) Negative    Leuk Esterase, UA Negative Negative    Nitrite, UA Negative Negative    Urobilinogen, UA 1.0 E.U./dL 0.2 - 1.0 E.U./dL   Green Top (Gel)    Collection Time: 04/30/24 10:49 AM   Result Value Ref Range    Extra Tube Hold for add-ons.    Lavender Top    Collection Time: 04/30/24 10:49 AM   Result Value Ref Range    Extra Tube hold for add-on    Gold Top - SST    Collection Time: 04/30/24 10:49 AM   Result Value Ref Range    Extra Tube Hold for add-ons.    Gray Top    Collection Time: 04/30/24 10:49 AM   Result Value Ref Range    Extra Tube Hold for add-ons.    Light Blue Top    Collection Time: 04/30/24 10:49 AM   Result Value Ref Range    Extra Tube Hold for add-ons.    CBC Auto Differential    Collection Time: 04/30/24 10:49 AM    Specimen: Blood   Result Value Ref Range    WBC 6.67 3.40 - 10.80 10*3/mm3    RBC 5.62 4.14 - 5.80 10*6/mm3    Hemoglobin 16.2 13.0 - 17.7 g/dL    Hematocrit 48.4 37.5 - 51.0 %    MCV 86.1 79.0 - 97.0 fL    MCH 28.8 26.6 - 33.0 pg    MCHC 33.5 31.5 - 35.7 g/dL    RDW 12.5 12.3 - 15.4 %    RDW-SD 39.4 37.0 - 54.0 fl    MPV 10.4 6.0 - 12.0 fL    Platelets 282 140 - 450 10*3/mm3    Neutrophil % 55.2 42.7 - 76.0 %    Lymphocyte % 34.3 19.6 - 45.3 %    Monocyte % 7.9 5.0 - 12.0 %    Eosinophil % 1.3 0.3 - 6.2 %    Basophil % 0.6 0.0 - 1.5 %    Immature Grans % 0.7 (H) 0.0 - 0.5 %    Neutrophils, Absolute 3.67 1.70 - 7.00 10*3/mm3    Lymphocytes, Absolute 2.29 0.70 - 3.10 10*3/mm3    Monocytes, Absolute 0.53 0.10 - 0.90 10*3/mm3    Eosinophils, Absolute 0.09 0.00 - 0.40  10*3/mm3    Basophils, Absolute 0.04 0.00 - 0.20 10*3/mm3    Immature Grans, Absolute 0.05 0.00 - 0.05 10*3/mm3    nRBC 0.0 0.0 - 0.2 /100 WBC   Single High Sensitivity Troponin T    Collection Time: 04/30/24 10:49 AM    Specimen: Blood   Result Value Ref Range    HS Troponin T 13 <22 ng/L   Lactic Acid, Plasma    Collection Time: 04/30/24 10:49 AM    Specimen: Blood   Result Value Ref Range    Lactate 1.1 0.5 - 2.0 mmol/L   C-reactive Protein    Collection Time: 04/30/24 10:49 AM    Specimen: Blood   Result Value Ref Range    C-Reactive Protein 0.83 (H) 0.00 - 0.50 mg/dL   Acetaminophen Level    Collection Time: 04/30/24 10:49 AM    Specimen: Blood   Result Value Ref Range    Acetaminophen <5.0 0.0 - 30.0 mcg/mL   Ethanol    Collection Time: 04/30/24 10:49 AM    Specimen: Blood   Result Value Ref Range    Ethanol <10 0 - 10 mg/dL   Urine Drug Screen - Urine, Clean Catch    Collection Time: 04/30/24 10:49 AM    Specimen: Urine, Clean Catch   Result Value Ref Range    THC, Screen, Urine Positive (A) Negative    Phencyclidine (PCP), Urine Negative Negative    Cocaine Screen, Urine Positive (A) Negative    Methamphetamine, Ur Negative Negative    Opiate Screen Negative Negative    Amphetamine Screen, Urine Negative Negative    Benzodiazepine Screen, Urine Positive (A) Negative    Tricyclic Antidepressants Screen Positive (A) Negative    Methadone Screen, Urine Negative Negative    Barbiturates Screen, Urine Negative Negative    Oxycodone Screen, Urine Negative Negative    Buprenorphine, Screen, Urine Negative Negative   Salicylate Level    Collection Time: 04/30/24 10:49 AM    Specimen: Blood   Result Value Ref Range    Salicylate <0.3 <=30.0 mg/dL   Magnesium    Collection Time: 04/30/24 10:49 AM    Specimen: Blood   Result Value Ref Range    Magnesium 1.7 1.6 - 2.6 mg/dL   Phosphorus    Collection Time: 04/30/24 10:49 AM    Specimen: Blood   Result Value Ref Range    Phosphorus 2.8 2.5 - 4.5 mg/dL   Hepatitis Panel,  Acute    Collection Time: 04/30/24 10:49 AM    Specimen: Blood   Result Value Ref Range    Hepatitis B Surface Ag Non-Reactive Non-Reactive    Hep A IgM Non-Reactive Non-Reactive    Hep B C IgM Non-Reactive Non-Reactive    Hepatitis C Ab Non-Reactive Non-Reactive   Lipid Panel    Collection Time: 04/30/24 10:49 AM    Specimen: Blood   Result Value Ref Range    Total Cholesterol 321 (H) 0 - 200 mg/dL    Triglycerides 495 (H) 0 - 150 mg/dL    HDL Cholesterol 38 (L) 40 - 60 mg/dL    LDL Cholesterol  182 (H) 0 - 100 mg/dL    VLDL Cholesterol 101 (H) 5 - 40 mg/dL    LDL/HDL Ratio 4.84    Hemoglobin A1c    Collection Time: 04/30/24 10:49 AM    Specimen: Blood   Result Value Ref Range    Hemoglobin A1C 10.10 (H) 4.80 - 5.60 %   Fentanyl, Urine - Urine, Clean Catch    Collection Time: 04/30/24 10:49 AM    Specimen: Urine, Clean Catch   Result Value Ref Range    Fentanyl, Urine Negative Negative   Urinalysis, Microscopic Only - Urine, Clean Catch    Collection Time: 04/30/24 10:49 AM    Specimen: Urine, Clean Catch   Result Value Ref Range    RBC, UA 0-2 None Seen, 0-2 /HPF    WBC, UA 0-2 None Seen, 0-2 /HPF    Bacteria, UA None Seen None Seen, Trace /HPF    Squamous Epithelial Cells, UA 0-2 None Seen, 0-2 /HPF    Hyaline Casts, UA 0-6 0 - 6 /LPF    Methodology Automated Microscopy    ECG 12 Lead Electrolyte Imbalance    Collection Time: 04/30/24 11:30 AM   Result Value Ref Range    QT Interval 386 ms    QTC Interval 413 ms   COVID-19, FLU A/B, RSV PCR 1 HR TAT - Swab, Nasopharynx    Collection Time: 04/30/24 11:36 AM    Specimen: Nasopharynx; Swab   Result Value Ref Range    COVID19 Not Detected Not Detected - Ref. Range    Influenza A PCR Not Detected Not Detected    Influenza B PCR Not Detected Not Detected    RSV, PCR Not Detected Not Detected       If labs were ordered, I independently reviewed the results and considered them in treating the patient.        RADIOLOGY  CT Abdomen Pelvis With Contrast    Result Date:  4/30/2024  CT ABDOMEN PELVIS W CONTRAST Date of Exam: 4/30/2024 12:37 PM EDT Indication: Severe epigastric pain with nausea and vomiting. Comparison: 11/24/2023 Technique: Axial CT images were obtained of the abdomen and pelvis following the uneventful intravenous administration of 83 mL Isovue-300. Reconstructed coronal and sagittal images were also obtained. Automated exposure control and iterative construction methods were used. Findings: There is mild edema with fat stranding around the pancreatic body and proximal tail. There is no evidence of a focal fluid collection. There is no pancreatic ductal dilatation. The pancreas is overall normal in size. There are no liver lesions. The gallbladder and biliary tree are nondilated. The adrenal glands and spleen appear normal. There is no renal lesion or hydronephrosis. The bladder is incompletely distended. The prostate gland is normal in size. There is diverticulosis without findings of  acute diverticulitis. There is a normal appendix. There is no bowel wall thickening.     Impression: Findings are compatible with acute pancreatitis. No evidence of ductal dilatation, necrosis, or abscess formation. Electronically Signed: Sharon Nguyen MD  4/30/2024 1:00 PM EDT  Workstation ID: [id number]     I ordered and independently reviewed the above noted radiographic studies.      I viewed images of CT scan of the abdomen pelvis which showed stranding around the pancreas but no other acute pathology that I can appreciate.  Per my independent interpretation.    See radiologist's dictation for official interpretation.        PROCEDURES    Procedures    ECG 12 Lead Electrolyte Imbalance   Preliminary Result   Test Reason : Electrolyte Imbalance   Blood Pressure :   */*   mmHG   Vent. Rate :  69 BPM     Atrial Rate :  69 BPM      P-R Int : 126 ms          QRS Dur :  94 ms       QT Int : 386 ms       P-R-T Axes : -16  52  40 degrees      QTc Int : 413 ms      Normal sinus rhythm    Nonspecific T wave abnormality   Abnormal ECG   When compared with ECG of 24-NOV-2023 22:39,   No significant change was found      Referred By: EMD           Confirmed By:           MEDICATIONS GIVEN IN ER    Medications   Sodium Chloride (PF) 0.9 % 10 mL (has no administration in time range)   lactated ringers infusion (125 mL/hr Intravenous New Bag 4/30/24 1317)   sodium chloride 0.9 % bolus 1,000 mL (0 mL Intravenous Stopped 4/30/24 1308)   ondansetron (ZOFRAN) injection 4 mg (4 mg Intravenous Given 4/30/24 1132)   ketorolac (TORADOL) injection 15 mg (15 mg Intravenous Given 4/30/24 1133)   dicyclomine (BENTYL) injection 20 mg (20 mg Intramuscular Given 4/30/24 1134)   iopamidol (ISOVUE-300) 61 % injection 83 mL (83 mL Intravenous Given 4/30/24 1246)         MEDICAL DECISION MAKING, PROGRESS, and CONSULTS    All labs, if obtained, have been independently reviewed by me.  All radiology studies, if obtained, have been reviewed by me and the radiologist dictating the report.  All EKG's, if obtained, have been independently viewed and interpreted by me/my attending physician.      Discussion below represents my analysis of pertinent findings related to patient's condition, differential diagnosis, treatment plan and final disposition.                         Differential diagnosis:    Pancreatitis, diverticulitis, bowel obstruction, cholecystitis, dehydration, UTI or pyelonephritis, sepsis, anemia, electrolyte abnormality      Additional sources:    - Discussed/ obtained information from independent historians:      - External (non-ED) record review:  Chart review of hospitalization for recurrent alcohol induced pancreatitis shows history of:  substance use, alcohol abuse, pancreatitis, Hypertriglyceridemia, and newly diagnosed Type 2 Diabetes Mellitus     - Chronic or social conditions impacting care: Uncontrolled diabetes, medication noncompliance, history of alcohol abuse, polysubstance abuse    - Shared  decision making: Agreeable to hospital admission      Orders placed during this visit:  Orders Placed This Encounter   Procedures    COVID PRE-OP / PRE-PROCEDURE SCREENING ORDER (NO ISOLATION) - Swab, Nasopharynx    COVID-19, FLU A/B, RSV PCR 1 HR TAT - Swab, Nasopharynx    CT Abdomen Pelvis With Contrast    [place] Draw    Comprehensive Metabolic Panel    Lipase    Urinalysis With Microscopic If Indicated (No Culture) - Urine, Clean Catch    CBC Auto Differential    Single High Sensitivity Troponin T    Lactic Acid, Plasma    C-reactive Protein    Acetaminophen Level    Ethanol    Urine Drug Screen - Urine, Clean Catch    Salicylate Level    Magnesium    Phosphorus    Hepatitis Panel, Acute    Lipid Panel    Hemoglobin A1c    Fentanyl, Urine - Urine, Clean Catch    Urinalysis, Microscopic Only - Urine, Clean Catch    NPO Diet NPO Type: Strict NPO    Undress & Gown    ECG 12 Lead Electrolyte Imbalance    Insert Peripheral IV    CBC & Differential    Green Top (Gel)    Lavender Top    Gold Top - SST    Gray Top    Light Blue Top         Additional orders considered but not ordered:      ED Course:    Consultants:      ED Course as of 04/30/24 1318   Tue Apr 30, 2024   1103 Chart review of hospitalization for recurrent alcohol induced pancreatitis shows history of:  substance use, alcohol abuse, pancreatitis, Hypertriglyceridemia, and newly diagnosed Type 2 Diabetes Mellitus [CC]   1108 This is a very nice 44-year-old man who presents because of nausea vomiting diarrhea and severe epigastric pain.  Symptoms started a couple of weeks ago he initially thought it was food poisoning from some steak.  Symptoms of gotten worse today the symptoms are similar to his prior episode of pancreatitis but at that time it was believed to be caused by alcohol and he says he has not been drinking.  No appreciated fevers.  No other complaints at this time. [CC]   1109 He arrived awake and alert generally well-appearing vitals are  within normal limits he does have tenderness in the epigastrium concerning for pancreatitis or diverticulitis.  Obtaining full abdominal laboratory workup adding on A1c lipid panel, hepatitis panel obtaining CT scan of the abdomen pelvis with contrast.  Treated with IV fluid Zofran Toradol and Bentyl will reevaluate pending initial workup. [CC]   1258 CBC reassuring and nonactionable.  Urinalysis shows hyperglycemia and protein but no evidence of infection.  UDS positive for THC, cocaine, benzodiazepines and TCAs.  A1c of 10.1 shows poorly controlled diabetes and he is significantly hyperglycemic here at 254 he says he has not been taking his metformin as directed.  Ethanol is not elevated.  Lipid panel shows hyperlipidemia and hypertriglyceridemia.  Hepatitis panel is negative.  COVID and flu swab was negative. [CC]   1259 Lipase of 264 is essentially diagnostic for acute pancreatitis. [CC]   1317 CT scan of the abdomen pelvis shows inflammation around the pancreas but no complication I discussed the findings with him he still having significant pain so I am planning on hospital admission for symptom control also hopefully will be able to get control of his uncontrolled diabetes however is confounded by the fact that he has not been taking his medications I discussed this with him and the importance of better control of his blood sugar.  Medicine team consulted for admission. [CC]      ED Course User Index  [CC] Miky Tse MD              Shared Decision Making:  After my consideration of clinical presentation and any laboratory/radiology studies obtained, I discussed the findings with the patient/patient representative who is in agreement with the treatment plan and the final disposition.   Risks and benefits of discharge and/or observation/admission were discussed.       AS OF 13:18 EDT VITALS:    BP - 115/89  HR - 67  TEMP - 98.7 °F (37.1 °C) (Oral)  O2 SATS - 97%      SARAH reviewed by Dedrick  Miky WOODSON MD           DIAGNOSIS  Final diagnoses:   Acute pancreatitis, unspecified complication status, unspecified pancreatitis type   Hyperglycemia due to diabetes mellitus   Type 2 diabetes mellitus without complication, without long-term current use of insulin   Acute recurrent pancreatitis   Hypertriglyceridemia         DISPOSITION  Admit      Please note that portions of this document were completed with voice recognition software.        Miky Tse MD  04/30/24 [id number]
